# Patient Record
Sex: FEMALE | Race: WHITE | Employment: FULL TIME | URBAN - NONMETROPOLITAN AREA
[De-identification: names, ages, dates, MRNs, and addresses within clinical notes are randomized per-mention and may not be internally consistent; named-entity substitution may affect disease eponyms.]

---

## 2018-02-13 ENCOUNTER — HOSPITAL ENCOUNTER (OUTPATIENT)
Age: 23
Setting detail: SPECIMEN
Discharge: HOME | End: 2018-02-13
Payer: COMMERCIAL

## 2018-02-13 LAB
HBV SURFACE AB TITR SER: 442.9 MIU/ML
RUBV IGG SER QL: 179.7 IU/ML

## 2018-02-13 PROCEDURE — 86765 RUBEOLA ANTIBODY: CPT

## 2018-02-13 PROCEDURE — 86317 IMMUNOASSAY INFECTIOUS AGENT: CPT

## 2018-02-13 PROCEDURE — 86787 VARICELLA-ZOSTER ANTIBODY: CPT

## 2018-02-13 PROCEDURE — 86762 RUBELLA ANTIBODY: CPT

## 2018-02-13 PROCEDURE — 86735 MUMPS ANTIBODY: CPT

## 2018-02-16 LAB
MEASLES IMMUNE (IGG): 1.65
MUV IGG SER QL: 1.88
VZV IGG SER QL IA: 4.2

## 2019-03-07 ENCOUNTER — HOSPITAL ENCOUNTER (OUTPATIENT)
Age: 24
Discharge: HOME | End: 2019-03-07
Payer: COMMERCIAL

## 2022-02-17 ENCOUNTER — TELEPHONE (OUTPATIENT)
Dept: OBGYN | Age: 27
End: 2022-02-17

## 2022-02-17 ENCOUNTER — HOSPITAL ENCOUNTER (OUTPATIENT)
Age: 27
Discharge: HOME | End: 2022-02-17
Payer: COMMERCIAL

## 2022-02-17 DIAGNOSIS — O20.0 THREATENED ABORTION: ICD-10-CM

## 2022-02-17 DIAGNOSIS — O20.0 THREATENED ABORTION: Primary | ICD-10-CM

## 2022-02-17 LAB — HCG QUANTITATIVE: 809 IU/L

## 2022-02-17 PROCEDURE — 84702 CHORIONIC GONADOTROPIN TEST: CPT

## 2022-02-17 PROCEDURE — 36415 COLL VENOUS BLD VENIPUNCTURE: CPT

## 2022-02-19 ENCOUNTER — HOSPITAL ENCOUNTER (OUTPATIENT)
Age: 27
Discharge: HOME | End: 2022-02-19
Payer: COMMERCIAL

## 2022-02-19 ENCOUNTER — NURSE TRIAGE (OUTPATIENT)
Dept: OTHER | Age: 27
End: 2022-02-19

## 2022-02-19 ENCOUNTER — TELEPHONE (OUTPATIENT)
Dept: OTHER | Age: 27
End: 2022-02-19

## 2022-02-19 DIAGNOSIS — O20.0 THREATENED ABORTION: ICD-10-CM

## 2022-02-19 LAB — HCG QUANTITATIVE: 276 IU/L

## 2022-02-19 PROCEDURE — 84702 CHORIONIC GONADOTROPIN TEST: CPT

## 2022-02-19 PROCEDURE — 36415 COLL VENOUS BLD VENIPUNCTURE: CPT

## 2022-02-19 NOTE — TELEPHONE ENCOUNTER
Patient of Dr. Lakeisha Tobias. Patient returning call stating she would really like to speak to on call provider. Patient was advised per guidelines to call office Monday to discuss lab results. Patient is currently having a miscarriage. She did complete labs today. She is still having vaginal bleeding and would like further care instruction from the on call provider. Flor Bowers called and connected to patient.       Reason for Disposition   Health Information question, no triage required and triager able to answer question    Protocols used: INFORMATION ONLY CALL - NO TRIAGE-ADULT-

## 2022-02-19 NOTE — TELEPHONE ENCOUNTER
Pt calling re: lab results on My Chart. Advised per [facility] guidelines to call office on Monday.   Brent Palma

## 2022-02-21 DIAGNOSIS — O20.0 THREATENED ABORTION: Primary | ICD-10-CM

## 2022-03-04 ENCOUNTER — HOSPITAL ENCOUNTER (OUTPATIENT)
Age: 27
Discharge: HOME | End: 2022-03-04
Payer: COMMERCIAL

## 2022-03-04 DIAGNOSIS — O20.0 THREATENED ABORTION: ICD-10-CM

## 2022-03-04 LAB — HCG QUANTITATIVE: 4 IU/L

## 2022-03-04 PROCEDURE — 36415 COLL VENOUS BLD VENIPUNCTURE: CPT

## 2022-03-04 PROCEDURE — 84702 CHORIONIC GONADOTROPIN TEST: CPT

## 2022-06-22 LAB
CHOLESTEROL/HDL RATIO: 2.7
CHOLESTEROL: 165 MG/DL
GLUCOSE BLD-MCNC: 87 MG/DL (ref 70–99)
HDLC SERPL-MCNC: 62 MG/DL
LDL CHOLESTEROL: 93 MG/DL (ref 0–130)
PATIENT FASTING?: YES
TRIGL SERPL-MCNC: 51 MG/DL

## 2022-06-26 ENCOUNTER — HOSPITAL ENCOUNTER (OUTPATIENT)
Age: 27
Setting detail: SPECIMEN
Discharge: HOME | End: 2022-06-26
Payer: COMMERCIAL

## 2022-06-26 LAB — HCG QUANTITATIVE: 1 MIU/ML

## 2022-06-26 PROCEDURE — 84702 CHORIONIC GONADOTROPIN TEST: CPT

## 2022-06-26 PROCEDURE — 36415 COLL VENOUS BLD VENIPUNCTURE: CPT

## 2022-07-11 ENCOUNTER — HOSPITAL ENCOUNTER (OUTPATIENT)
Age: 27
Discharge: HOME | End: 2022-07-11
Payer: COMMERCIAL

## 2022-07-11 DIAGNOSIS — Z32.01 POSITIVE URINE PREGNANCY TEST: Primary | ICD-10-CM

## 2022-07-11 DIAGNOSIS — Z32.01 POSITIVE URINE PREGNANCY TEST: ICD-10-CM

## 2022-07-11 LAB — HCG QUANTITATIVE: 222 MIU/ML

## 2022-07-11 PROCEDURE — 36415 COLL VENOUS BLD VENIPUNCTURE: CPT

## 2022-07-11 PROCEDURE — 84702 CHORIONIC GONADOTROPIN TEST: CPT

## 2022-07-13 ENCOUNTER — HOSPITAL ENCOUNTER (OUTPATIENT)
Age: 27
Discharge: HOME | End: 2022-07-13
Payer: COMMERCIAL

## 2022-07-13 DIAGNOSIS — Z32.01 POSITIVE URINE PREGNANCY TEST: ICD-10-CM

## 2022-07-13 LAB — HCG QUANTITATIVE: 99 MIU/ML

## 2022-07-13 PROCEDURE — 84702 CHORIONIC GONADOTROPIN TEST: CPT

## 2022-07-13 PROCEDURE — 36415 COLL VENOUS BLD VENIPUNCTURE: CPT

## 2022-07-15 ENCOUNTER — HOSPITAL ENCOUNTER (EMERGENCY)
Age: 27
Discharge: HOME | End: 2022-07-15
Attending: STUDENT IN AN ORGANIZED HEALTH CARE EDUCATION/TRAINING PROGRAM
Payer: COMMERCIAL

## 2022-07-15 ENCOUNTER — APPOINTMENT (OUTPATIENT)
Dept: ULTRASOUND IMAGING | Age: 27
End: 2022-07-15
Payer: COMMERCIAL

## 2022-07-15 ENCOUNTER — NURSE TRIAGE (OUTPATIENT)
Dept: OTHER | Age: 27
End: 2022-07-15

## 2022-07-15 ENCOUNTER — HOSPITAL ENCOUNTER (OUTPATIENT)
Age: 27
Discharge: HOME | End: 2022-07-15
Payer: COMMERCIAL

## 2022-07-15 VITALS
RESPIRATION RATE: 16 BRPM | DIASTOLIC BLOOD PRESSURE: 56 MMHG | TEMPERATURE: 98.4 F | OXYGEN SATURATION: 98 % | HEART RATE: 86 BPM | SYSTOLIC BLOOD PRESSURE: 112 MMHG

## 2022-07-15 DIAGNOSIS — O03.9 MISCARRIAGE: Primary | ICD-10-CM

## 2022-07-15 LAB
-: NORMAL
ABO/RH: NORMAL
BILIRUBIN URINE: NEGATIVE
COLOR: YELLOW
EPITHELIAL CELLS UA: NORMAL /HPF (ref 0–25)
GLUCOSE URINE: NEGATIVE
HCG QUANTITATIVE: 106 MIU/ML
KETONES, URINE: NEGATIVE
LEUKOCYTE ESTERASE, URINE: NEGATIVE
NITRITE, URINE: NEGATIVE
PH UA: 6 (ref 5–9)
PROTEIN UA: NEGATIVE
RBC UA: NORMAL /HPF (ref 0–2)
SPECIFIC GRAVITY UA: 1.02 (ref 1.01–1.02)
TURBIDITY: CLEAR
URINE HGB: ABNORMAL
UROBILINOGEN, URINE: NORMAL
WBC UA: NORMAL /HPF (ref 0–5)

## 2022-07-15 PROCEDURE — 99284 EMERGENCY DEPT VISIT MOD MDM: CPT

## 2022-07-15 PROCEDURE — 86900 BLOOD TYPING SEROLOGIC ABO: CPT

## 2022-07-15 PROCEDURE — 81001 URINALYSIS AUTO W/SCOPE: CPT

## 2022-07-15 PROCEDURE — 84702 CHORIONIC GONADOTROPIN TEST: CPT

## 2022-07-15 PROCEDURE — 36415 COLL VENOUS BLD VENIPUNCTURE: CPT

## 2022-07-15 PROCEDURE — 86901 BLOOD TYPING SEROLOGIC RH(D): CPT

## 2022-07-15 PROCEDURE — 76817 TRANSVAGINAL US OBSTETRIC: CPT

## 2022-07-15 ASSESSMENT — ENCOUNTER SYMPTOMS
SHORTNESS OF BREATH: 0
NAUSEA: 0
ABDOMINAL PAIN: 0
VOMITING: 0
COUGH: 0

## 2022-07-15 NOTE — TELEPHONE ENCOUNTER
Pt calling regarding lab results and HCG levels. Pt also complaining abdominal cramping that started today along with passing a decent size clot per the pt. Pt states she has been bleeding for three weeks. Pt is currently only filling one pad a day. Pt agreeable to care advice given per office guidelines. Reason for Disposition   Health Information question, no triage required and triager able to answer question    Protocols used:  Information Only Call - No Triage-[facility]

## 2022-07-16 NOTE — DISCHARGE INSTRUCTIONS
Follow-up with your OB/GYN this week. If you develop severe pain, fevers, sweats, or any other concerns please return to the emergency department.

## 2022-07-16 NOTE — ED PROVIDER NOTES
[facility] ED  EMERGENCY DEPARTMENT ENCOUNTER      Pt Name: Jaun Martinez  MRN: [id number]  [place] 1995  Date of evaluation: 7/15/2022  Provider: Carlos [facility]       Chief Complaint   Patient presents with    Vaginal Bleeding     Vaginal bleeding x 3 week. HISTORY OF PRESENT ILLNESS   (Location/Symptom, Timing/Onset, Context/Setting, Quality, Duration, Modifying Factors, Severity)  Note limiting factors. Jaun Martinez is a 31 y.o. female who presents to the emergency department with persistent vaginal bleeding and cramping. Patient states that she is a G2, P0 female who had a positive pregnancy test 3 weeks ago and has been having persistent vaginal bleeding since then as well as some mild pelvic cramping that is controlled with Tylenol. She has been following with her OBG however her beta-hCG has not fallen to 0 yet. Last reading was 111. She called her OB office and was told to come in to be evaluated in the emergency department. She denies any fevers, chills, sweats, chest pain or difficulty breathing, abdominal pain, nausea, vomiting, diarrhea, leg swelling, abnormal vaginal discharge or concern for STD exposure. Denies dysuria or hematuria. HPI    Nursing Notes were reviewed. REVIEW OF SYSTEMS    (2-9 systems for level 4, 10 or more for level 5)     Review of Systems   Constitutional:  Negative for chills and fever. HENT: Negative. Respiratory:  Negative for cough and shortness of breath. Cardiovascular:  Negative for chest pain and palpitations. Gastrointestinal:  Negative for abdominal pain, nausea and vomiting. Genitourinary:  Positive for pelvic pain and vaginal bleeding. Negative for dysuria and vaginal discharge. Skin:  Negative for rash and wound. Neurological:  Negative for weakness, numbness and headaches. Except as noted above the remainder of the review of systems was reviewed and negative.        PAST MEDICAL HISTORY History reviewed. No pertinent past medical history. SURGICAL HISTORY     History reviewed. No pertinent surgical history. CURRENT MEDICATIONS       There are no discharge medications for this patient. ALLERGIES     Ceclor [cefaclor] and Pcn [penicillins]    FAMILY HISTORY     History reviewed. No pertinent family history. SOCIAL HISTORY       Social History     Socioeconomic History    Marital status: Single     Spouse name: None    Number of children: None    Years of education: None    Highest education level: None   Tobacco Use    Smoking status: Never    Smokeless tobacco: Never       SCREENINGS        Stefani Coma Scale  Eye Opening: Spontaneous  Best Verbal Response: Oriented  Best Motor Response: Obeys commands  Stefani Coma Scale Score: 15               PHYSICAL EXAM    (up to 7 for level 4, 8 or more for level 5)     ED Triage Vitals   BP Temp Temp Source Heart Rate Resp SpO2 Height Weight   07/15/22 2010 07/15/22 2024 07/15/22 2024 07/15/22 2009 07/15/22 2009 07/15/22 2009 -- --   (!) 112/56 98.4 °F (36.9 °C) Tympanic 86 16 98 %         Physical Exam  Vitals and nursing note reviewed. Constitutional:       General: She is not in acute distress. Appearance: Normal appearance. She is not ill-appearing, toxic-appearing or diaphoretic. Cardiovascular:      Rate and Rhythm: Normal rate and regular rhythm. Heart sounds: No murmur heard. No gallop. Pulmonary:      Effort: Pulmonary effort is normal.      Breath sounds: Normal breath sounds. Abdominal:      General: There is no distension. Tenderness: There is no abdominal tenderness. Skin:     General: Skin is warm and dry. Neurological:      Mental Status: She is alert.        DIAGNOSTIC RESULTS     EKG: All EKG's are interpreted by the Emergency Department Physician who either signs or Co-signs this chart in the absence of a cardiologist.        RADIOLOGY:   Non-plain film images such as CT, Ultrasound and MRI are read by the radiologist. Plain radiographic images are visualized and preliminarily interpreted by the emergency physician with the below findings:        Interpretation per the Radiologist below, if available at the time of this note:    US OB TRANSVAGINAL   Final Result   There are two 2 mm cystic areas in the endometrium at the lower uterine   segment. Some mild heterogenous material at the site but without   hypervascularity. The remaining mid and upper portion of the endometrium is   not thickened or hypervascular. 1.1 cm heterogenous area in the right ovary is likely a degenerating corpus   luteum. No significant free fluid in the pelvis. ED BEDSIDE ULTRASOUND:   Performed by ED Physician - none    LABS:  Labs Reviewed   URINALYSIS WITH REFLEX TO CULTURE - Abnormal; Notable for the following components:       Result Value    Specific Gravity, UA 1.025 (*)     Urine Hgb 2+ (*)     All other components within normal limits   MICROSCOPIC URINALYSIS   ABO/RH       All other labs were within normal range or not returned as of this dictation. EMERGENCY DEPARTMENT COURSE and DIFFERENTIAL DIAGNOSIS/MDM:   Vitals:    Vitals:    07/15/22 2009 07/15/22 2010 07/15/22 2024   BP:  (!) 112/56    Pulse: 86     Resp: 16     Temp:   98.4 °F (36.9 °C)   TempSrc:   Tympanic   SpO2: 98%         Patient is a 25-year-old female presenting due to miscarriage and beta hCG not coming back down to 0. On exam vitals are normal patient is in no acute distress. Abdomen is soft and nontender. Patient states that she has been having mild vaginal bleeding and spotting for the past 3 weeks and when she called her OB/GYN's office, she was told to be evaluated in the emergency department. Blood type was obtained and patient was found to be a positive and therefore no need for RhoGAM.  Urinalysis unremarkable.   Transvaginal ultrasound reveals some heterogeneous material at the lower segment of the uterus, likely material from miscarriage. As patient's beta-hCG has been slowly coming down and she is still bleeding, I instructed her that she will likely pass the rest of this material at home and instructed her to follow-up with her OB/GYN. If she develops any severe pain, fevers, chills or sweats patient instructed to return to the emergency department. MDM        REASSESSMENT          CRITICAL CARE TIME       CONSULTS:  None    PROCEDURES:  Unless otherwise noted below, none     Procedures      FINAL IMPRESSION      1. Miscarriage          DISPOSITION/PLAN   DISPOSITION Decision To Discharge 07/15/2022 11:43:56 PM      PATIENT REFERRED TO:  OB/GYN          DISCHARGE MEDICATIONS:  There are no discharge medications for this patient. Controlled Substances Monitoring:     No flowsheet data found.     (Please note that portions of this note were completed with a voice recognition program.  Efforts were made to edit the dictations but occasionally words are mis-transcribed.)    Mila Woodson DO (electronically signed)  Attending Emergency Physician           Mason Simpson DO  07/15/22 5720

## 2022-07-18 DIAGNOSIS — O03.9 SAB (SPONTANEOUS ABORTION): Primary | ICD-10-CM

## 2022-07-19 ENCOUNTER — HOSPITAL ENCOUNTER (OUTPATIENT)
Age: 27
Discharge: HOME | End: 2022-07-19
Payer: COMMERCIAL

## 2022-07-19 DIAGNOSIS — O03.9 SAB (SPONTANEOUS ABORTION): ICD-10-CM

## 2022-07-19 LAB — HCG QUANTITATIVE: 36 MIU/ML

## 2022-07-19 PROCEDURE — 36415 COLL VENOUS BLD VENIPUNCTURE: CPT

## 2022-07-19 PROCEDURE — 84702 CHORIONIC GONADOTROPIN TEST: CPT

## 2022-07-20 ENCOUNTER — OFFICE VISIT (OUTPATIENT)
Dept: OBGYN | Age: 27
End: 2022-07-20
Payer: COMMERCIAL

## 2022-07-20 VITALS
DIASTOLIC BLOOD PRESSURE: 78 MMHG | WEIGHT: 178 LBS | HEIGHT: 64 IN | SYSTOLIC BLOOD PRESSURE: 118 MMHG | BODY MASS INDEX: 30.39 KG/M2

## 2022-07-20 DIAGNOSIS — O03.9 SAB (SPONTANEOUS ABORTION): Primary | ICD-10-CM

## 2022-07-20 PROCEDURE — 99213 OFFICE O/P EST LOW 20 MIN: CPT

## 2022-07-21 ENCOUNTER — TELEPHONE (OUTPATIENT)
Dept: OBGYN CLINIC | Age: 27
End: 2022-07-21

## 2022-07-21 DIAGNOSIS — O03.9 SAB (SPONTANEOUS ABORTION): Primary | ICD-10-CM

## 2022-07-21 NOTE — TELEPHONE ENCOUNTER
Please call patient and let her know that I spoke with Dr. Jose M Cole about recommendations for labs. She suggested that we do a day 21 progesterone lab. Once I have her hCG lab from next week, I will add this as a standing order and she can have it drawn on day 21 of next cycle. If low, we can start supplementing with next positive pregnancy test to help prevent another miscarriage. Please also ask her if she has any family history of blood clots - if so, we can offer coagulation panel to make sure there are no other contributing factors. Thanks!

## 2022-07-26 ENCOUNTER — HOSPITAL ENCOUNTER (OUTPATIENT)
Age: 27
Discharge: HOME | End: 2022-07-26
Payer: COMMERCIAL

## 2022-07-26 DIAGNOSIS — O03.9 SAB (SPONTANEOUS ABORTION): Primary | ICD-10-CM

## 2022-07-26 LAB — HCG QUANTITATIVE: 33 MIU/ML

## 2022-07-26 PROCEDURE — 84702 CHORIONIC GONADOTROPIN TEST: CPT

## 2022-07-26 PROCEDURE — 36415 COLL VENOUS BLD VENIPUNCTURE: CPT

## 2022-07-26 NOTE — PROGRESS NOTES
DATE OF VISIT:  7/26/22    PATIENT NAME:  Rakesh Cartagena     YOB: 1995    REASON FOR VISIT:    Chief Complaint   Patient presents with    Miscarriage     Patient is being seen for SAB, she notes that she stopped bleeding yesterday. She had been bleeding since 6/25/22. Angelica Oar yesterday did drop significantly. She has slight back pain. She feels like she has been dealing ok, has a good support system. HISTORY OF PRESENT ILLNESS:  Patient presents for SAB. She was initially scheduled to discuss irregular bleeding but was determined to be having SAB. Irregular bleeding has been ongoing since 06/25/2022 but did stop yesterday. hCG quant currently at 39 - will repeat next week and discuss plan from there. Patient curious about options to help prevent future miscarriages as she had one previously in February. Discussed with Dr. Alberto Never - would recommend checking day 21 progesterone to make sure supplementation not needed. Aware that she will receive call with next steps pending next week's hCG level. Patient's last menstrual period was 06/25/2022. Vitals:    07/20/22 0818   BP: 118/78   Weight: 178 lb (80.7 kg)   Height: 5' 4\" (1.626 m)     Body mass index is 30.55 kg/m². Allergies   Allergen Reactions    Ceclor [Cefaclor]     Pcn [Penicillins] Rash     Current Outpatient Medications   Medication Sig Dispense Refill    Prenatal MV-Min-Fe Fum-FA-DHA (PRENATAL 1 PO) Take by mouth       No current facility-administered medications for this visit.      Social History     Socioeconomic History    Marital status: Single     Spouse name: None    Number of children: None    Years of education: None    Highest education level: None   Tobacco Use    Smoking status: Never    Smokeless tobacco: Never   Substance and Sexual Activity    Alcohol use: Not Currently     Comment: Occasional    Drug use: Never    Sexual activity: Yes     Partners: Male       REVIEW OF SYSTEMS:  Review of Systems Constitutional:  Negative for chills, fatigue and fever. Genitourinary:  Positive for menstrual problem (irregular bleeding, SAB). Negative for dysuria, frequency, pelvic pain and vaginal discharge. PHYSICAL EXAM:  /78   Ht 5' 4\" (1.626 m)   Wt 178 lb (80.7 kg)   LMP 2022   Breastfeeding Unknown   BMI 30.55 kg/m²   Physical Exam  Constitutional:       Appearance: Normal appearance. HENT:      Head: Normocephalic and atraumatic. Mouth/Throat:      Mouth: Mucous membranes are moist.   Eyes:      Extraocular Movements: Extraocular movements intact. Musculoskeletal:         General: Normal range of motion. Cervical back: Normal range of motion. Neurological:      General: No focal deficit present. Mental Status: She is alert and oriented to person, place, and time. Skin:     General: Skin is warm and dry. Psychiatric:         Mood and Affect: Mood normal.         Behavior: Behavior normal.         Thought Content: Thought content normal.     The patient, Evaline Epley is a 31 y.o. female, was seen with a total time spent of 20 minutes for the visit on this date of service by the E/M provider. The time component had both face to face and non face to face time spent in determining the total time component. Counseling and education regarding her diagnosis listed below and her options regarding those diagnoses were also included in determining her time component. The patient had her preventative health maintenance recommendations and follow-up reviewed with her at the completion of her visit. ASSESSMENT:  1. SAB (spontaneous )        PLAN:  No orders of the defined types were placed in this encounter. Return in about 1 week (around 2022) for repeat hCG quant.        Electronically signed by Keily Ferrari PA-C on 22

## 2022-08-02 NOTE — TELEPHONE ENCOUNTER
Tried to call patient and remind her to have repeat HCG this week. There was no answer and VM was full. Will try again tomorrow.

## 2022-08-03 ENCOUNTER — HOSPITAL ENCOUNTER (OUTPATIENT)
Age: 27
Discharge: HOME | End: 2022-08-03
Payer: COMMERCIAL

## 2022-08-03 DIAGNOSIS — O03.9 SAB (SPONTANEOUS ABORTION): ICD-10-CM

## 2022-08-03 LAB — HCG QUANTITATIVE: <1 MIU/ML

## 2022-08-03 PROCEDURE — 36415 COLL VENOUS BLD VENIPUNCTURE: CPT

## 2022-08-03 PROCEDURE — 84702 CHORIONIC GONADOTROPIN TEST: CPT

## 2022-08-08 NOTE — TELEPHONE ENCOUNTER
Poke with patient, today is day 1 of cycle. Advised her to have progesterone level drawn on 8/29/22. Order entered.

## 2022-08-29 ENCOUNTER — HOSPITAL ENCOUNTER (OUTPATIENT)
Age: 27
Discharge: HOME | End: 2022-08-29
Payer: COMMERCIAL

## 2022-08-29 DIAGNOSIS — O03.9 SAB (SPONTANEOUS ABORTION): ICD-10-CM

## 2022-08-29 LAB
HCG QUANTITATIVE: <1 MIU/ML
PROGESTERONE LEVEL: 5.77 NG/ML

## 2022-08-29 PROCEDURE — 84144 ASSAY OF PROGESTERONE: CPT

## 2022-08-29 PROCEDURE — 36415 COLL VENOUS BLD VENIPUNCTURE: CPT

## 2022-08-29 PROCEDURE — 84702 CHORIONIC GONADOTROPIN TEST: CPT

## 2022-12-08 ENCOUNTER — TELEPHONE (OUTPATIENT)
Dept: OBGYN | Age: 27
End: 2022-12-08

## 2022-12-08 ENCOUNTER — HOSPITAL ENCOUNTER (OUTPATIENT)
Age: 27
Discharge: HOME | End: 2022-12-08
Payer: COMMERCIAL

## 2022-12-08 DIAGNOSIS — N91.2 AMENORRHEA: Primary | ICD-10-CM

## 2022-12-08 DIAGNOSIS — N91.2 AMENORRHEA: ICD-10-CM

## 2022-12-08 LAB — HCG QUANTITATIVE: <1 MIU/ML

## 2022-12-08 PROCEDURE — 84702 CHORIONIC GONADOTROPIN TEST: CPT

## 2022-12-08 PROCEDURE — 36415 COLL VENOUS BLD VENIPUNCTURE: CPT

## 2022-12-08 NOTE — TELEPHONE ENCOUNTER
Patient LVM on  phone. I return called as Eduar Hensley gave me the message. Patient states she is 5days late on her cycle but keeps getting neg. At home tests. Would like lab work to confirm. HCG lab order was placed.

## 2022-12-12 DIAGNOSIS — N92.6 LATE MENSES: Primary | ICD-10-CM

## 2023-02-16 ENCOUNTER — HOSPITAL ENCOUNTER (OUTPATIENT)
Age: 28
Discharge: HOME | End: 2023-02-16
Payer: COMMERCIAL

## 2023-02-16 ENCOUNTER — TELEPHONE (OUTPATIENT)
Dept: OBGYN | Age: 28
End: 2023-02-16

## 2023-02-16 DIAGNOSIS — Z32.01 POSITIVE PREGNANCY TEST: ICD-10-CM

## 2023-02-16 DIAGNOSIS — Z32.01 POSITIVE PREGNANCY TEST: Primary | ICD-10-CM

## 2023-02-16 LAB — HCG QUANTITATIVE: 92 MIU/ML

## 2023-02-16 PROCEDURE — 84702 CHORIONIC GONADOTROPIN TEST: CPT

## 2023-02-16 PROCEDURE — 36415 COLL VENOUS BLD VENIPUNCTURE: CPT

## 2023-02-16 NOTE — TELEPHONE ENCOUNTER
Patient called and stated she has a positive pregnancy test but is spotting. Requesting an HCG draw. Order placed.

## 2023-02-18 ENCOUNTER — HOSPITAL ENCOUNTER (OUTPATIENT)
Age: 28
Discharge: HOME | End: 2023-02-18
Payer: COMMERCIAL

## 2023-02-18 DIAGNOSIS — Z32.01 POSITIVE PREGNANCY TEST: ICD-10-CM

## 2023-02-18 LAB — HCG QUANTITATIVE: 73 MIU/ML

## 2023-02-18 PROCEDURE — 36415 COLL VENOUS BLD VENIPUNCTURE: CPT

## 2023-02-18 PROCEDURE — 84702 CHORIONIC GONADOTROPIN TEST: CPT

## 2023-02-20 ENCOUNTER — HOSPITAL ENCOUNTER (OUTPATIENT)
Age: 28
Discharge: HOME | End: 2023-02-20
Payer: COMMERCIAL

## 2023-02-20 DIAGNOSIS — O03.9 SAB (SPONTANEOUS ABORTION): Primary | ICD-10-CM

## 2023-02-20 DIAGNOSIS — O03.9 SAB (SPONTANEOUS ABORTION): ICD-10-CM

## 2023-02-20 LAB — HCG QUANTITATIVE: 32 MIU/ML

## 2023-02-20 PROCEDURE — 36415 COLL VENOUS BLD VENIPUNCTURE: CPT

## 2023-02-20 PROCEDURE — 84702 CHORIONIC GONADOTROPIN TEST: CPT

## 2023-02-28 ENCOUNTER — OFFICE VISIT (OUTPATIENT)
Dept: OBGYN | Age: 28
End: 2023-02-28

## 2023-02-28 ENCOUNTER — HOSPITAL ENCOUNTER (OUTPATIENT)
Age: 28
Discharge: HOME | End: 2023-02-28
Payer: COMMERCIAL

## 2023-02-28 VITALS
BODY MASS INDEX: 32.95 KG/M2 | WEIGHT: 193 LBS | DIASTOLIC BLOOD PRESSURE: 70 MMHG | HEIGHT: 64 IN | SYSTOLIC BLOOD PRESSURE: 112 MMHG

## 2023-02-28 DIAGNOSIS — N96 HISTORY OF RECURRENT MISCARRIAGES, NOT CURRENTLY PREGNANT: ICD-10-CM

## 2023-02-28 DIAGNOSIS — Q51.3 BICORNATE UTERUS: ICD-10-CM

## 2023-02-28 DIAGNOSIS — N96 HISTORY OF RECURRENT MISCARRIAGES, NOT CURRENTLY PREGNANT: Primary | ICD-10-CM

## 2023-02-28 LAB
HCG QUANTITATIVE: <1 MIU/ML
T3 SERPL-MCNC: 123 NG/DL (ref 60–181)
TSH SERPL-ACNC: 0.76 UIU/ML (ref 0.3–5)

## 2023-02-28 PROCEDURE — 85610 PROTHROMBIN TIME: CPT

## 2023-02-28 PROCEDURE — 36415 COLL VENOUS BLD VENIPUNCTURE: CPT

## 2023-02-28 PROCEDURE — 85730 THROMBOPLASTIN TIME PARTIAL: CPT

## 2023-02-28 PROCEDURE — 84436 ASSAY OF TOTAL THYROXINE: CPT

## 2023-02-28 PROCEDURE — 85613 RUSSELL VIPER VENOM DILUTED: CPT

## 2023-02-28 PROCEDURE — 81241 F5 GENE: CPT

## 2023-02-28 PROCEDURE — 84439 ASSAY OF FREE THYROXINE: CPT

## 2023-02-28 PROCEDURE — 84480 ASSAY TRIIODOTHYRONINE (T3): CPT

## 2023-02-28 PROCEDURE — 83036 HEMOGLOBIN GLYCOSYLATED A1C: CPT

## 2023-02-28 PROCEDURE — 81240 F2 GENE: CPT

## 2023-02-28 PROCEDURE — 84702 CHORIONIC GONADOTROPIN TEST: CPT

## 2023-02-28 PROCEDURE — 81291 MTHFR GENE: CPT

## 2023-02-28 PROCEDURE — 84443 ASSAY THYROID STIM HORMONE: CPT

## 2023-02-28 PROCEDURE — 86147 CARDIOLIPIN ANTIBODY EA IG: CPT

## 2023-02-28 ASSESSMENT — PATIENT HEALTH QUESTIONNAIRE - PHQ9
SUM OF ALL RESPONSES TO PHQ QUESTIONS 1-9: 0
1. LITTLE INTEREST OR PLEASURE IN DOING THINGS: 0
SUM OF ALL RESPONSES TO PHQ QUESTIONS 1-9: 0
SUM OF ALL RESPONSES TO PHQ QUESTIONS 1-9: 0
SUM OF ALL RESPONSES TO PHQ9 QUESTIONS 1 & 2: 0
SUM OF ALL RESPONSES TO PHQ QUESTIONS 1-9: 0
2. FEELING DOWN, DEPRESSED OR HOPELESS: 0

## 2023-02-28 NOTE — PROGRESS NOTES
PROBLEM VISIT     Date of service: 2023    Cecy Rios  Is a 28 y.o. , female    PT's PCP is: None None     : 1995                                             Subjective:       Patient's last menstrual period was 2023. OB History    Para Term  AB Living   2 0     2 0   SAB IAB Ectopic Molar Multiple Live Births   2                # Outcome Date GA Lbr Faisal/2nd Weight Sex Delivery Anes PTL Lv   2 SAB 2022 4w0d    SAB      1 SAB 2022 6w0d    SAB   ND        Social History     Tobacco Use   Smoking Status Never   Smokeless Tobacco Never        Social History     Substance and Sexual Activity   Alcohol Use Not Currently    Comment: Occasional       Allergies: Ceclor [cefaclor] and Pcn [penicillins]      Current Outpatient Medications:     Prenatal MV-Min-Fe Fum-FA-DHA (PRENATAL 1 PO), Take by mouth, Disp: , Rfl:     progesterone (ENDOMETRIN) 100 MG suppository, Place 1 suppository vaginally daily (Patient not taking: Reported on 2023), Disp: 30 suppository, Rfl: 3    Social History     Substance and Sexual Activity   Sexual Activity Yes    Partners: Male       Last Yearly date:  about 3 years ago. Last pap date and results: about 3 years ago. Last HPV date and results: about 3 years ago    Have you had a positive covid test: No    Have you had the covid immunization: No    Chief Complaint   Patient presents with    Infertility     Patient here to discuss infertility. Patient has had 3 SAB in the last year. Patient was on progesterone for two weeks until she started her cycle for 2 months. Than Dr. Syeda De La Rosa had her do the progesterone just when she gets a positive pregnancy test. Wants to discuss options they have. PE:  Vital Signs  Blood pressure 112/70, height 5' 4\" (1.626 m), weight 193 lb (87.5 kg), last menstrual period 2023, not currently breastfeeding.   Estimated body mass index is 33.13 kg/m² as calculated from the following:    Height as of this encounter: 5' 4\" (1.626 m). Weight as of this encounter: 193 lb (87.5 kg). PHQ-9 Total Score: 0 (2/28/2023  1:46 PM)    NURSE: CHARBEL Coffman CNM      HPI: Patient here today for recurrent miscarriages. Patient states they are all about the same. Her first 1 was in feb 22 pt was about 5 weeks. start bleeding lasted 2-3 weeks  July 22 very early - bleed for 30-40 day slight late period u/s with this one  Feb 23- positive at home test feb 14  Quants 16,18,20  Lmp should have been the 11th - did not bleed till the 20  Now she is spotting. No family history of clotting issues or miscarriage issues. Yes PT denies fever, chills, nausea and vomiting       Objective                     Results reviewed today:    2/28/2023  2:31 PM EST   UTERUS: Homogenous appearing bicornuate uterus, small 8 mm fibroid noted      ENDO: measures 2.6 mm     RT. OVARY: sub cm cysts noted     LT. OVARY: sub-cm cysts noted     No free fluid      Latest Reference Range & Units 2/17/22 17:12 2/19/22 16:53 3/4/22 03:55 6/26/22 06:47 7/11/22 14:31 7/13/22 12:28 7/15/22 16:43 7/19/22 16:52 7/26/22 04:45 8/3/22 05:07 8/29/22 12:32 12/8/22 15:37 2/16/23 16:25 2/18/23 11:45 2/20/23 17:06   hCG Quant <5 mIU/mL 809 (H) 276 (H) 4 1 222 (H) 99 (H) 106 (H) 36 (H) 33 (H) <1 <1 <1 92 (H) 73 (H) 32 (H)   (H): Data is abnormally high                            Assessment and Plan          Diagnosis Orders   1. 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 Bicornate uterus              Sperm Analysis orders also given to patient for significant other      I am having Urban Gamboa. Porfirio maintain her Prenatal MV-Min-Fe Fum-FA-DHA (PRENATAL 1 PO) and progesterone. Return in about 2 weeks (around 3/14/2023), or lab reveiw.     She was also counseled on her preventative health maintenance recommendations and follow-up. There are no Patient Instructions on file for this visit.     Paulette Officer, CHARBEL Amezcua CNM,2/28/2023 2:49 PM

## 2023-03-01 LAB
CARDIOLIPIN IGA SER IA-ACNC: 2.1 APL (ref 0–14)
CARDIOLIPIN IGG SER IA-ACNC: 2.4 GPL (ref 0–10)
CARDIOLIPIN IGM SER IA-ACNC: 5.5 MPL (ref 0–10)
DILUTE RUSSELL VIPER VENOM TIME: NORMAL
EST. AVERAGE GLUCOSE BLD GHB EST-MCNC: 97 MG/DL
HBA1C MFR BLD: 5 % (ref 4–6)
INR PPP: 0.9
PARTIAL THROMBOPLASTIN TIME: 31.7 SEC (ref 26.8–34.8)
PROTHROMBIN TIME: 12.3 SEC (ref 11.5–14.2)
T4 FREE SERPL-MCNC: 1.14 NG/DL (ref 0.93–1.7)
T4 SERPL-MCNC: 9 UG/DL (ref 4.5–10.9)

## 2023-03-03 LAB
MTHFR INTERPRETATION: NORMAL
MTHFR MUTATION A1286C: NEGATIVE
MTHFR MUTATION C665T: NEGATIVE
SPECIMEN: NORMAL

## 2023-03-05 LAB
FACTOR V MUTATION: ABNORMAL
PROTHROMBIN G20210A MUTATION: NEGATIVE
SPECIMEN SOURCE: NORMAL
SPECIMEN: ABNORMAL

## 2023-03-08 ENCOUNTER — TELEPHONE (OUTPATIENT)
Dept: OBGYN | Age: 28
End: 2023-03-08

## 2023-03-14 ENCOUNTER — OFFICE VISIT (OUTPATIENT)
Dept: OBGYN | Age: 28
End: 2023-03-14
Payer: COMMERCIAL

## 2023-03-14 VITALS
BODY MASS INDEX: 33.29 KG/M2 | SYSTOLIC BLOOD PRESSURE: 118 MMHG | WEIGHT: 195 LBS | DIASTOLIC BLOOD PRESSURE: 70 MMHG | HEIGHT: 64 IN

## 2023-03-14 DIAGNOSIS — D68.51 FACTOR V LEIDEN MUTATION (HCC): ICD-10-CM

## 2023-03-14 DIAGNOSIS — N96 HISTORY OF RECURRENT MISCARRIAGES, NOT CURRENTLY PREGNANT: ICD-10-CM

## 2023-03-14 DIAGNOSIS — Q51.3 BICORNATE UTERUS: Primary | ICD-10-CM

## 2023-03-14 PROCEDURE — 99213 OFFICE O/P EST LOW 20 MIN: CPT | Performed by: ADVANCED PRACTICE MIDWIFE

## 2023-03-14 NOTE — PROGRESS NOTES
PROBLEM VISIT     Date of service: 3/14/2023    Jacqueline Ruiz  Is a 28 y.o. single, female    PT's PCP is: None None     : 1995                                             Subjective:       Patient's last menstrual period was 2023 (exact date). OB History    Para Term  AB Living   3 0     3 0   SAB IAB Ectopic Molar Multiple Live Births   3                # Outcome Date GA Lbr Faisal/2nd Weight Sex Delivery Anes PTL Lv   3 2023 4w0d    SAB      2 SAB 2022 4w0d    SAB      1 2022 6w0d    SAB   ND        Social History     Tobacco Use   Smoking Status Never   Smokeless Tobacco Never        Social History     Substance and Sexual Activity   Alcohol Use Not Currently    Comment: Occasional       Social History     Substance and Sexual Activity   Sexual Activity Yes    Partners: Male       Allergies: Ceclor [cefaclor] and Pcn [penicillins]    Chief Complaint   Patient presents with    Recurrent Miscarriage     Pt is here today to discuss labs from 23 x was recurrent miscarriages. Last Yearly date:  aprox 2 years ago    Last pap date and results: aprox 2 years ago(-). Last HPV date and results: unknown    Have you had a positive covid test: No    Have you had the covid immunization: No    PE:  Vital Signs  Blood pressure 118/70, height 5' 4\" (1.626 m), weight 195 lb (88.5 kg), last menstrual period 2023, not currently breastfeeding. Estimated body mass index is 33.47 kg/m² as calculated from the following:    Height as of this encounter: 5' 4\" (1.626 m). Weight as of this encounter: 195 lb (88.5 kg).     No data recorded      NURSE: LMD    HPI: Here today for lab and ultrasound review due to recurrent miscarriages    Yes  PT denies fever, chills, nausea and vomiting         Results reviewed today:     Latest Reference Range & Units 23 14:27 23 14:56 23 14:57   Hemoglobin A1C 4.0 - 6.0 %  5.0    eAG (mg/dL) mg/dL  97    hCG Quant <5 mIU/mL   <1   T3, Total 60 - 181 ng/dL  123    T4, Total 4.5 - 10.9 ug/dL   9.0   TSH 0.30 - 5.00 uIU/mL   0.76   Thyroxine, Free 0.93 - 1.70 ng/dL   1.14   Anticardiolipin IgA 0.0 - 14.0 APL  2.1    PROTHROMBIN MUTATION   Negative    dRVVT NEGATIVE for Lupus Anticoagulant   NEGATIVE for Lupus Anticoagulant    Prothrombin Time 11.5 - 14.2 sec  12.3    INR   0.9    PTT 26.8 - 34.8 sec  31.7    PT PCR Specimen   Whole Blood    Anticardiolipin IgG 0.0 - 10.0 GPL  2.4    Cardiolipin Ab IgM 0.0 - 10.0 MPL  5.5    Specimen   Whole Blood  Whole Blood    MTHFR Interp   See Note    US NON OB TRANSVAGINAL  Rpt     Factor V Mutation   Heterozygous ! MTHFR Mutation [id number]   Negative    MTHFR Mutation [id number]   Negative                           Narrative   Table formatting from the original result was not included. 2/28/2023  2:31 PM EST    UTERUS: Homogenous appearing bicornuate uterus, small 8 mm fibroid noted        ENDO: measures 2.6 mm       RT. OVARY: sub cm cysts noted       LT. OVARY: sub-cm cysts noted       No free fluid                                  Assessment and Plan          Diagnosis Orders   1. Bicornate uterus  Amb External Referral To Endocrinology      2. Factor V Leiden mutation [facility])  Amb External Referral To Endocrinology      3. History of recurrent miscarriages, not currently pregnant  Amb External Referral To Endocrinology                I am having Marlee Pak. Porfirio maintain her Prenatal MV-Min-Fe Fum-FA-DHA (PRENATAL 1 PO) and progesterone. Return for referal to [address] [place] [address]. She was also counseled on her preventative health maintenance recommendations and follow-up. There are no Patient Instructions on file for this visit.     CHARBEL Treviño CNM,3/14/2023 2:56 PM

## 2023-12-07 ENCOUNTER — ANCILLARY PROCEDURE (OUTPATIENT)
Dept: OBGYN | Age: 28
End: 2023-12-07
Payer: COMMERCIAL

## 2023-12-07 ENCOUNTER — INITIAL PRENATAL (OUTPATIENT)
Dept: OBGYN | Age: 28
End: 2023-12-07

## 2023-12-07 ENCOUNTER — HOSPITAL ENCOUNTER (OUTPATIENT)
Age: 28
Discharge: HOME | End: 2023-12-07
Payer: COMMERCIAL

## 2023-12-07 VITALS — BODY MASS INDEX: 35.87 KG/M2 | DIASTOLIC BLOOD PRESSURE: 74 MMHG | WEIGHT: 209 LBS | SYSTOLIC BLOOD PRESSURE: 112 MMHG

## 2023-12-07 DIAGNOSIS — N91.2 AMENORRHEA: ICD-10-CM

## 2023-12-07 DIAGNOSIS — O36.80X0 ENCOUNTER TO DETERMINE FETAL VIABILITY OF PREGNANCY, SINGLE OR UNSPECIFIED FETUS: ICD-10-CM

## 2023-12-07 DIAGNOSIS — Z32.01 POSITIVE PREGNANCY TEST: ICD-10-CM

## 2023-12-07 DIAGNOSIS — O99.211 OBESITY AFFECTING PREGNANCY IN FIRST TRIMESTER, UNSPECIFIED OBESITY TYPE: ICD-10-CM

## 2023-12-07 DIAGNOSIS — Z34.81 ENCOUNTER FOR SUPERVISION OF OTHER NORMAL PREGNANCY IN FIRST TRIMESTER: ICD-10-CM

## 2023-12-07 DIAGNOSIS — Z34.81 ENCOUNTER FOR SUPERVISION OF OTHER NORMAL PREGNANCY IN FIRST TRIMESTER: Primary | ICD-10-CM

## 2023-12-07 PROBLEM — N84.0 POLYP OF CORPUS UTERI: Status: ACTIVE | Noted: 2023-02-28

## 2023-12-07 LAB
ABO + RH BLD: NORMAL
BLOOD GROUP ANTIBODIES SERPL: NEGATIVE
EST. AVERAGE GLUCOSE BLD GHB EST-MCNC: 91 MG/DL
HBA1C MFR BLD: 4.8 % (ref 4–6)

## 2023-12-07 PROCEDURE — 87340 HEPATITIS B SURFACE AG IA: CPT

## 2023-12-07 PROCEDURE — 83036 HEMOGLOBIN GLYCOSYLATED A1C: CPT

## 2023-12-07 PROCEDURE — 87491 CHLMYD TRACH DNA AMP PROBE: CPT

## 2023-12-07 PROCEDURE — 87591 N.GONORRHOEAE DNA AMP PROB: CPT

## 2023-12-07 PROCEDURE — 85025 COMPLETE CBC W/AUTO DIFF WBC: CPT

## 2023-12-07 PROCEDURE — 36415 COLL VENOUS BLD VENIPUNCTURE: CPT

## 2023-12-07 PROCEDURE — 86901 BLOOD TYPING SEROLOGIC RH(D): CPT

## 2023-12-07 PROCEDURE — 86762 RUBELLA ANTIBODY: CPT

## 2023-12-07 PROCEDURE — 86803 HEPATITIS C AB TEST: CPT

## 2023-12-07 PROCEDURE — 87389 HIV-1 AG W/HIV-1&-2 AB AG IA: CPT

## 2023-12-07 PROCEDURE — 86900 BLOOD TYPING SEROLOGIC ABO: CPT

## 2023-12-07 PROCEDURE — 86850 RBC ANTIBODY SCREEN: CPT

## 2023-12-07 PROCEDURE — 86780 TREPONEMA PALLIDUM: CPT

## 2023-12-07 PROCEDURE — 87086 URINE CULTURE/COLONY COUNT: CPT

## 2023-12-08 LAB
BASOPHILS # BLD: <0.03 K/UL (ref 0–0.2)
BASOPHILS NFR BLD: 0 % (ref 0–2)
CHLAMYDIA DNA UR QL NAA+PROBE: NEGATIVE
EOSINOPHIL # BLD: 0.09 K/UL (ref 0–0.44)
EOSINOPHILS RELATIVE PERCENT: 1 % (ref 1–4)
ERYTHROCYTE [DISTWIDTH] IN BLOOD BY AUTOMATED COUNT: 11.7 % (ref 11.8–14.4)
HBV SURFACE AG SERPL QL IA: NONREACTIVE
HCT VFR BLD AUTO: 38.7 % (ref 36.3–47.1)
HCV AB SERPL QL IA: NONREACTIVE
HGB BLD-MCNC: 13.2 G/DL (ref 11.9–15.1)
HIV 1+2 AB+HIV1 P24 AG SERPL QL IA: NONREACTIVE
IMM GRANULOCYTES # BLD AUTO: <0.03 K/UL (ref 0–0.3)
IMM GRANULOCYTES NFR BLD: 0 %
LYMPHOCYTES NFR BLD: 1.3 K/UL (ref 1.1–3.7)
LYMPHOCYTES RELATIVE PERCENT: 18 % (ref 24–43)
MCH RBC QN AUTO: 30.7 PG (ref 25.2–33.5)
MCHC RBC AUTO-ENTMCNC: 34.1 G/DL (ref 28.4–34.8)
MCV RBC AUTO: 90 FL (ref 82.6–102.9)
MICROORGANISM SPEC CULT: NO GROWTH
MONOCYTES NFR BLD: 0.47 K/UL (ref 0.1–1.2)
MONOCYTES NFR BLD: 7 % (ref 3–12)
N GONORRHOEA DNA UR QL NAA+PROBE: NEGATIVE
NEUTROPHILS NFR BLD: 74 % (ref 36–65)
NEUTS SEG NFR BLD: 5.34 K/UL (ref 1.5–8.1)
NRBC BLD-RTO: 0 PER 100 WBC
PLATELET # BLD AUTO: 207 K/UL (ref 138–453)
PMV BLD AUTO: 9.5 FL (ref 8.1–13.5)
RBC # BLD AUTO: 4.3 M/UL (ref 3.95–5.11)
RUBV IGG SERPL QL IA: 143.3 IU/ML
SPECIMEN DESCRIPTION: NORMAL
SPECIMEN DESCRIPTION: NORMAL
T PALLIDUM AB SER QL IA: NONREACTIVE
WBC OTHER # BLD: 7.2 K/UL (ref 3.5–11.3)

## 2024-01-05 SDOH — ECONOMIC STABILITY: TRANSPORTATION INSECURITY
IN THE PAST 12 MONTHS, HAS LACK OF TRANSPORTATION KEPT YOU FROM MEETINGS, WORK, OR FROM GETTING THINGS NEEDED FOR DAILY LIVING?: NO

## 2024-01-05 SDOH — ECONOMIC STABILITY: INCOME INSECURITY: HOW HARD IS IT FOR YOU TO PAY FOR THE VERY BASICS LIKE FOOD, HOUSING, MEDICAL CARE, AND HEATING?: NOT HARD AT ALL

## 2024-01-05 SDOH — ECONOMIC STABILITY: FOOD INSECURITY: WITHIN THE PAST 12 MONTHS, YOU WORRIED THAT YOUR FOOD WOULD RUN OUT BEFORE YOU GOT MONEY TO BUY MORE.: NEVER TRUE

## 2024-01-05 SDOH — ECONOMIC STABILITY: FOOD INSECURITY: WITHIN THE PAST 12 MONTHS, THE FOOD YOU BOUGHT JUST DIDN'T LAST AND YOU DIDN'T HAVE MONEY TO GET MORE.: NEVER TRUE

## 2024-01-05 SDOH — ECONOMIC STABILITY: HOUSING INSECURITY
IN THE LAST 12 MONTHS, WAS THERE A TIME WHEN YOU DID NOT HAVE A STEADY PLACE TO SLEEP OR SLEPT IN A SHELTER (INCLUDING NOW)?: NO

## 2024-01-08 ENCOUNTER — ROUTINE PRENATAL (OUTPATIENT)
Dept: OBGYN | Age: 29
End: 2024-01-08

## 2024-01-08 ENCOUNTER — HOSPITAL ENCOUNTER (OUTPATIENT)
Age: 29
Setting detail: SPECIMEN
Discharge: HOME | End: 2024-01-08
Payer: COMMERCIAL

## 2024-01-08 VITALS
DIASTOLIC BLOOD PRESSURE: 78 MMHG | BODY MASS INDEX: 35.81 KG/M2 | SYSTOLIC BLOOD PRESSURE: 126 MMHG | HEART RATE: 97 BPM | WEIGHT: 208.6 LBS

## 2024-01-08 DIAGNOSIS — Z3A.11 11 WEEKS GESTATION OF PREGNANCY: ICD-10-CM

## 2024-01-08 DIAGNOSIS — N96 HISTORY OF RECURRENT MISCARRIAGES: ICD-10-CM

## 2024-01-08 DIAGNOSIS — Z12.4 SCREENING FOR CERVICAL CANCER: ICD-10-CM

## 2024-01-08 DIAGNOSIS — D68.51 FACTOR V LEIDEN MUTATION (HCC): ICD-10-CM

## 2024-01-08 DIAGNOSIS — Q51.28 UTERINE SEPTUM: ICD-10-CM

## 2024-01-08 DIAGNOSIS — Z3A.11 11 WEEKS GESTATION OF PREGNANCY: Primary | ICD-10-CM

## 2024-01-08 PROCEDURE — G0145 SCR C/V CYTO,THINLAYER,RESCR: HCPCS

## 2024-01-08 PROCEDURE — 0502F SUBSEQUENT PRENATAL CARE: CPT | Performed by: ADVANCED PRACTICE MIDWIFE

## 2024-01-08 ASSESSMENT — PATIENT HEALTH QUESTIONNAIRE - PHQ9
SUM OF ALL RESPONSES TO PHQ QUESTIONS 1-9: 0
SUM OF ALL RESPONSES TO PHQ9 QUESTIONS 1 & 2: 0
SUM OF ALL RESPONSES TO PHQ QUESTIONS 1-9: 0
1. LITTLE INTEREST OR PLEASURE IN DOING THINGS: 0
2. FEELING DOWN, DEPRESSED OR HOPELESS: 0
SUM OF ALL RESPONSES TO PHQ QUESTIONS 1-9: 0
SUM OF ALL RESPONSES TO PHQ QUESTIONS 1-9: 0

## 2024-01-08 NOTE — PROGRESS NOTES
Hugo Monroy is here at 11w5d for:    Chief Complaint   Patient presents with    Routine Prenatal Visit     F/U in house u/s for FHT. TBE-PAP, pt stats no issues or concerns        Estimated Due Date: Estimated Date of Delivery: 24    OB History    Para Term  AB Living   4 0     3 0   SAB IAB Ectopic Molar Multiple Live Births   3                # Outcome Date GA Lbr Faisal/2nd Weight Sex Delivery Anes PTL Lv   4 Current            3 SAB 2023 4w0d    SAB      2 SAB 2022 4w0d    SAB      1 SAB 2022 6w0d    SAB           Past Medical History:   Diagnosis Date    Factor V Leiden (HCC)     Infertility, female     Uterine disorder        Past Surgical History:   Procedure Laterality Date    ARM SURGERY Right     times 2, broken arm    HYSTEROSCOPY  2023    hysteroscopic metroplasty       Social History     Tobacco Use   Smoking Status Never   Smokeless Tobacco Never        Social History     Substance and Sexual Activity   Alcohol Use Not Currently       No results found for this visit on 24.        HPI: Patient here today for OB visit.  Patient states she is doing well at this time and denies needs or concerns    Yes PT denies fever, chills, nausea and vomiting       Vitals:  Estimated body mass index is 35.81 kg/m² as calculated from the following:    Height as of 3/14/23: 1.626 m (5' 4\").    Weight as of this encounter: 94.6 kg (208 lb 9.6 oz).  BP: 126/78  Weight - Scale: 94.6 kg (208 lb 9.6 oz)  Pulse: 97  Patient Position: Sitting  Albumin: Negative  Glucose: Negative  Fetal HR: 159  Movement: Absent           Total body exam completed please see prenatal physical exam tab in visit navigator   OB Prenatal Exam: Last filed by Sonia Horne APRN - CNM on 2024  1:55 PM    General Physical Exam    HEENT: normal Heart: normal Skin: normal   Thyroid: normal Lungs: normal Extremities: normal   Lymph Nodes: normal Breasts: normal Flat nipples may need nipple shield Neurological:

## 2024-01-14 LAB
Lab: NORMAL
NTRA FETAL FRACTION: NORMAL
NTRA GENDER OF FETUS: NORMAL
NTRA MONOSOMY X AGE-BASED RISK TEXT: NORMAL
NTRA MONOSOMY X RESULT TEXT: NORMAL
NTRA MONOSOMY X RISK SCORE TEXT: NORMAL
NTRA TRIPLOIDY RESULT TEXT: NORMAL
NTRA TRISOMY 13 AGE-BASED RISK TEXT: NORMAL
NTRA TRISOMY 13 RESULT TEXT: NORMAL
NTRA TRISOMY 13 RISK SCORE TEXT: NORMAL
NTRA TRISOMY 18 AGE-BASED RISK TEXT: NORMAL
NTRA TRISOMY 18 RESULT TEXT: NORMAL
NTRA TRISOMY 18 RISK SCORE TEXT: NORMAL
NTRA TRISOMY 21 AGE-BASED RISK TEXT: NORMAL
NTRA TRISOMY 21 RESULT TEXT: NORMAL
NTRA TRISOMY 21 RISK SCORE TEXT: NORMAL

## 2024-01-23 LAB — CYTOLOGY REPORT: NORMAL

## 2024-02-05 ENCOUNTER — ROUTINE PRENATAL (OUTPATIENT)
Dept: OBGYN | Age: 29
End: 2024-02-05

## 2024-02-05 VITALS
SYSTOLIC BLOOD PRESSURE: 130 MMHG | DIASTOLIC BLOOD PRESSURE: 86 MMHG | BODY MASS INDEX: 35.19 KG/M2 | WEIGHT: 205 LBS | HEART RATE: 83 BPM

## 2024-02-05 DIAGNOSIS — N96 HISTORY OF RECURRENT MISCARRIAGES: ICD-10-CM

## 2024-02-05 DIAGNOSIS — D68.51 FACTOR V LEIDEN (HCC): ICD-10-CM

## 2024-02-05 DIAGNOSIS — Z3A.15 15 WEEKS GESTATION OF PREGNANCY: Primary | ICD-10-CM

## 2024-02-05 PROCEDURE — 0502F SUBSEQUENT PRENATAL CARE: CPT | Performed by: ADVANCED PRACTICE MIDWIFE

## 2024-02-05 NOTE — PROGRESS NOTES
Hugo M Jadedanny is here at 15w5d for:    Chief Complaint   Patient presents with    Routine Prenatal Visit     Pt states no issues or concerns        Estimated Due Date: Estimated Date of Delivery: 24    OB History    Para Term  AB Living   4 0     3 0   SAB IAB Ectopic Molar Multiple Live Births   3                # Outcome Date GA Lbr Faisal/2nd Weight Sex Delivery Anes PTL Lv   4 Current            3 SAB 2023 4w0d    SAB      2 SAB 2022 4w0d    SAB      1 SAB 2022 6w0d    SAB           Past Medical History:   Diagnosis Date    Factor V Leiden (HCC)     Infertility, female     Uterine disorder        Past Surgical History:   Procedure Laterality Date    ARM SURGERY Right     times 2, broken arm    HYSTEROSCOPY  2023    hysteroscopic metroplasty       Social History     Tobacco Use   Smoking Status Never   Smokeless Tobacco Never        Social History     Substance and Sexual Activity   Alcohol Use Not Currently               HPI: PT here today for OB visit - pt denies questions at this time and is not feeling baby move yet. Pt is feeling better than first trimester.     Yes PT denies fever, chills, nausea and vomiting       Vitals:  Estimated body mass index is 35.19 kg/m² as calculated from the following:    Height as of 3/14/23: 1.626 m (5' 4\").    Weight as of this encounter: 93 kg (205 lb).  BP: 130/86  Weight - Scale: 93 kg (205 lb)  Pulse: 83  Patient Position: Sitting  Albumin: Negative  Glucose: Negative  Fetal HR: 140  Movement: Absent           Abdomen: u/2    Results reviewed today:    No results found for this visit on 24.        ASSESSMENT & Plan    Diagnosis Orders   1. 15 weeks gestation of pregnancy        2. History of recurrent miscarriages        3. Factor V Leiden (HCC)                  I am having Hugo MAXIMINOEverton Monroy maintain her Prenatal MV-Min-Fe Fum-FA-DHA (PRENATAL 1 PO), progesterone, Progesterone, and ASPIRIN 81 PO.    Return for Keep next appt.    There are

## 2024-02-12 NOTE — TELEPHONE ENCOUNTER
Dr. Herndon, patient interested in free prenatal vitamins and iron per response from Maternity Risk Survey.    Order for [facility] Baby Box pending for your convenience.    Thank you,  Jaqui Ag, PharmD  Ambulatory Care Clinical Pharmacist- [facility]  Department, toll free: [phone]  [facility]      =======================================================================    [facility] CLINICAL PHARMACY REVIEW - Be Well With Baby    SUBJECTIVE  Hugo Monroy is a 28 y.o. female currently identified as interested in receiving a BS \"Baby Box\" containing a 1 year supply of prenatal vitamins from [facility].    Contents of Baby Box:  Welcome Letter  6 month supply of Nature's Blend Prenatal Multivitamin with Minerals (Take 1 softgel once daily) with 1 refill    Thank you  Jaqui Ag, PharmD  Ambulatory Care Clinical Pharmacist- [facility] Clinical Pharmacy  Department, toll free: [phone]  [facility]

## 2024-03-05 ENCOUNTER — ROUTINE PRENATAL (OUTPATIENT)
Dept: OBGYN | Age: 29
End: 2024-03-05

## 2024-03-05 VITALS
HEART RATE: 83 BPM | BODY MASS INDEX: 35.43 KG/M2 | WEIGHT: 206.4 LBS | SYSTOLIC BLOOD PRESSURE: 124 MMHG | DIASTOLIC BLOOD PRESSURE: 74 MMHG

## 2024-03-05 DIAGNOSIS — Z36.89 SCREENING, ANTENATAL, FOR FETAL ANATOMIC SURVEY: ICD-10-CM

## 2024-03-05 DIAGNOSIS — Z3A.20 20 WEEKS GESTATION OF PREGNANCY: Primary | ICD-10-CM

## 2024-03-05 PROCEDURE — 0502F SUBSEQUENT PRENATAL CARE: CPT | Performed by: ADVANCED PRACTICE MIDWIFE

## 2024-03-05 NOTE — PROGRESS NOTES
Hugo Monroy is here at 19w6d for:    Chief Complaint   Patient presents with    Routine Prenatal Visit     F/U in house 20 week u/s. Boy, pt states no issues or concerns        Estimated Due Date: Estimated Date of Delivery: 24    OB History    Para Term  AB Living   4 0     3 0   SAB IAB Ectopic Molar Multiple Live Births   3                # Outcome Date GA Lbr Faisal/2nd Weight Sex Delivery Anes PTL Lv   4 Current            3 2023 4w0d    SAB      2 SAB 2022 4w0d    SAB      1 2022 6w0d    SAB           Past Medical History:   Diagnosis Date    Factor V Leiden (HCC)     Infertility, female     Uterine disorder        Past Surgical History:   Procedure Laterality Date    ARM SURGERY Right     times 2, broken arm    HYSTEROSCOPY  2023    hysteroscopic metroplasty       Social History     Tobacco Use   Smoking Status Never   Smokeless Tobacco Never        Social History     Substance and Sexual Activity   Alcohol Use Not Currently               HPI: Patient here today for routine prenatal visit with anatomy screen. Patient denies concerns.    Yes PT denies fever, chills, nausea and vomiting       Vitals:  Estimated body mass index is 35.43 kg/m² as calculated from the following:    Height as of 3/14/23: 1.626 m (5' 4\").    Weight as of this encounter: 93.6 kg (206 lb 6.4 oz).  BP: 124/74  Weight - Scale: 93.6 kg (206 lb 6.4 oz)  Pulse: 83  Patient Position: Sitting  Albumin: Negative  Glucose: Negative  Fetal HR: 145 US  Movement: Absent           Abdomen: soft    Results reviewed today:    3/5/2024  1:50 PM EST      20.0 WK IUP  CL:3.6 cm  HR:145 bpm  Posterior placenta, breech presentation  Ovaries: both seen, wnl.  Gender: male  Active fetal movements  4CH, LVOT, and spine suboptimal d/t fetal position.  All other visualized fetal anatomy WNL.       ASSESSMENT & Plan    Diagnosis Orders   1. 20 weeks gestation of pregnancy        2. Screening, , for fetal anatomic

## 2024-04-02 ENCOUNTER — ROUTINE PRENATAL (OUTPATIENT)
Dept: OBGYN | Age: 29
End: 2024-04-02

## 2024-04-02 VITALS
HEART RATE: 108 BPM | WEIGHT: 210 LBS | SYSTOLIC BLOOD PRESSURE: 122 MMHG | DIASTOLIC BLOOD PRESSURE: 74 MMHG | BODY MASS INDEX: 36.05 KG/M2

## 2024-04-02 DIAGNOSIS — Z34.02 SUPERVISION OF NORMAL FIRST PREGNANCY IN SECOND TRIMESTER: ICD-10-CM

## 2024-04-02 DIAGNOSIS — Z3A.23 23 WEEKS GESTATION OF PREGNANCY: Primary | ICD-10-CM

## 2024-04-02 PROBLEM — N96 HISTORY OF RECURRENT MISCARRIAGES, NOT CURRENTLY PREGNANT: Status: RESOLVED | Noted: 2023-02-28 | Resolved: 2024-04-02

## 2024-04-02 PROCEDURE — 0502F SUBSEQUENT PRENATAL CARE: CPT | Performed by: ADVANCED PRACTICE MIDWIFE

## 2024-04-02 NOTE — PROGRESS NOTES
Hugo Monroy is here at 23w6d for:    Chief Complaint   Patient presents with    Routine Prenatal Visit     F/U in house u/s. Pt states no issues or concerns        Estimated Due Date: Estimated Date of Delivery: 24    OB History    Para Term  AB Living   4 0     3 0   SAB IAB Ectopic Molar Multiple Live Births   3                # Outcome Date GA Lbr Faisal/2nd Weight Sex Delivery Anes PTL Lv   4 Current            3 2023 4w0d    SAB      2 SAB 2022 4w0d    SAB      1 2022 6w0d    SAB           Past Medical History:   Diagnosis Date    Factor V Leiden (HCC)     Infertility, female     Uterine disorder        Past Surgical History:   Procedure Laterality Date    ARM SURGERY Right     times 2, broken arm    HYSTEROSCOPY  2023    hysteroscopic metroplasty       Social History     Tobacco Use   Smoking Status Never   Smokeless Tobacco Never        Social History     Substance and Sexual Activity   Alcohol Use Not Currently               HPI: Patient here today for routine prenatal visit with follow-up limited anatomy ultrasound screen. Patient denies issues or concerns.    Yes PT denies fever, chills, nausea and vomiting       Vitals:  Estimated body mass index is 36.05 kg/m² as calculated from the following:    Height as of 3/14/23: 1.626 m (5' 4\").    Weight as of this encounter: 95.3 kg (210 lb).  BP: 122/74  Weight - Scale: 95.3 kg (210 lb)  Pulse: (!) 108  Patient Position: Sitting  Albumin: Negative  Glucose: Negative  Fetal HR: 137  Movement: Present           Abdomen: soft    Results reviewed today:      2024  1:42 PM EDT   OB limited - f/u anatomy scan   4CH heart, LVOT, & spine visualized, wnl  HR- 137bpm  CL- 3.6cm  Position- breech  Placenta- posterior  Active fetal movements          ASSESSMENT & Plan    Diagnosis Orders   1. 23 weeks gestation of pregnancy        2. Supervision of normal first pregnancy in second trimester                  I am having Hugo NARAYAN

## 2024-04-04 ENCOUNTER — CARE COORDINATION (OUTPATIENT)
Dept: OTHER | Facility: CLINIC | Age: 29
End: 2024-04-04

## 2024-04-04 NOTE — CARE COORDINATION
Anesthesia Evaluation                  Airway   Mallampati: II  Dental      Pulmonary    Cardiovascular     (+) hypertension,       Neuro/Psych  GI/Hepatic/Renal/Endo    (+)   hypothyroidism,     Musculoskeletal     Abdominal    Substance History      OB/GYN          Other                        Anesthesia Plan    ASA 3     general with block          Ambulatory Care Coordination Note    ACM attempted to reach patient for introduction to Associate Care Management related to Maternity CM. HIPAA compliant message left requesting a return phone call at patient convenience.     Plan for follow-up call in 10-14 days      Future Appointments   Date Time Provider Department Center   4/23/2024  1:20 PM Sonia Horne APRN - CNM [facility]   5/15/2024  9:30 AM [facility] OB[facility] ULTRASOUND [facility]   5/15/2024 10:30 AM Sonia Horne APRN - CNM [facility]       RADHA Ascencio, RN  Associate Care Manager   Cell: [phone]  [email or web address]

## 2024-05-01 ENCOUNTER — HOSPITAL ENCOUNTER (OUTPATIENT)
Age: 29
Setting detail: SPECIMEN
Discharge: HOME | End: 2024-05-01
Payer: COMMERCIAL

## 2024-05-01 ENCOUNTER — CARE COORDINATION (OUTPATIENT)
Dept: OTHER | Facility: CLINIC | Age: 29
End: 2024-05-01

## 2024-05-01 ENCOUNTER — ROUTINE PRENATAL (OUTPATIENT)
Dept: OBGYN | Age: 29
End: 2024-05-01
Payer: COMMERCIAL

## 2024-05-01 VITALS
BODY MASS INDEX: 36.56 KG/M2 | SYSTOLIC BLOOD PRESSURE: 124 MMHG | DIASTOLIC BLOOD PRESSURE: 80 MMHG | WEIGHT: 213 LBS | HEART RATE: 114 BPM

## 2024-05-01 DIAGNOSIS — Z13.1 ENCOUNTER FOR SCREENING EXAMINATION FOR IMPAIRED GLUCOSE REGULATION AND DIABETES MELLITUS: ICD-10-CM

## 2024-05-01 DIAGNOSIS — Z3A.28 28 WEEKS GESTATION OF PREGNANCY: ICD-10-CM

## 2024-05-01 DIAGNOSIS — Z3A.28 28 WEEKS GESTATION OF PREGNANCY: Primary | ICD-10-CM

## 2024-05-01 DIAGNOSIS — O26.893 HEARTBURN DURING PREGNANCY IN THIRD TRIMESTER: ICD-10-CM

## 2024-05-01 DIAGNOSIS — R12 HEARTBURN DURING PREGNANCY IN THIRD TRIMESTER: ICD-10-CM

## 2024-05-01 LAB
GLUCOSE 1H P 50 G GLC PO SERPL-MCNC: 99 MG/DL (ref 70–135)
GLUCOSE 60 MIN, POC: 146
GLUCOSE ADMINISTRATION: NORMAL
HGB, POC: 11.7

## 2024-05-01 PROCEDURE — 0502F SUBSEQUENT PRENATAL CARE: CPT | Performed by: ADVANCED PRACTICE MIDWIFE

## 2024-05-01 PROCEDURE — 36415 COLL VENOUS BLD VENIPUNCTURE: CPT | Performed by: ADVANCED PRACTICE MIDWIFE

## 2024-05-01 PROCEDURE — 82950 GLUCOSE TEST: CPT

## 2024-05-01 RX ORDER — OMEPRAZOLE 20 MG/1
20 CAPSULE, DELAYED RELEASE ORAL
Qty: 180 CAPSULE | Refills: 1 | Status: SHIPPED | OUTPATIENT
Start: 2024-05-01

## 2024-05-01 RX ORDER — OMEPRAZOLE 20 MG/1
20 TABLET, DELAYED RELEASE ORAL DAILY
Qty: 90 TABLET | Refills: 1 | Status: SHIPPED | OUTPATIENT
Start: 2024-05-01 | End: 2024-05-01 | Stop reason: CLARIF

## 2024-05-01 NOTE — PROGRESS NOTES
Pt did void but QNS   
hour      2. Encounter for screening examination for impaired glucose regulation and diabetes mellitus  POCT Glucose Tolerance 1 Hr    Glucose tolerance, 1 hour      3. Heartburn during pregnancy in third trimester  DISCONTINUED: omeprazole (PRILOSEC OTC) 20 MG tablet                I have discontinued Hugo Monroy's omeprazole. I am also having her start on omeprazole. Additionally, I am having her maintain her Prenatal MV-Min-Fe Fum-FA-DHA (PRENATAL 1 PO), progesterone, Progesterone, ASPIRIN 81 PO, and Misc. Devices.    Return for Keep next appt.    There are no Patient Instructions on file for this visit.      Clinical References           Weeks 26 to 30 of Your Pregnancy: Care Instructions  You're starting your last trimester. You'll probably feel your baby moving around more. Your back may ache as your body gets used to your baby's size and length. Take care of yourself, and pay attention to what your body needs.    Talk to your doctor about getting the Tdap shot. It will help protect your  against whooping cough (pertussis). Also ask your doctor about flu and COVID-19 shots if you haven't had them yet. If your blood type is Rh negative, you may be given a shot of Rh immune globulin (such as RhoGAM). It can help prevent problems for your baby.    You may have Nance-Abbott contractions. They are single or several strong contractions without a pattern. These are practice contractions but not the start of labor.  Be kind to yourself.     Take breaks when you're tired.  Change positions often. Don't sit for too long or stand for too long.  At work, rest during breaks if you can. If you don't get breaks, talk to your doctor about writing a letter to your employer to request them.  Avoid fumes, chemicals, and tobacco smoke.  Be sexual if you want to.     You may be interested in sex, or you may not. Everyone is different.  Sex is okay unless your doctor tells you not to.  Your belly can make it hard to

## 2024-05-01 NOTE — CARE COORDINATION
Ambulatory Care Coordination Note    ACM attempted 2nd outreach to patient for introduction to Associate Care Management related to Maternity Cm. HIPAA compliant message left requesting a return phone call at patient convenience.     Unable to Reach Letter sent to patient via [employer].    Will continue to outreach.      Future Appointments   Date Time Provider Department Center   5/1/2024  1:30 PM [facility]   5/15/2024  9:30 AM [facility] OB[facility] ULTRASOUND [facility]   5/15/2024 10:30 AM Sonia Horne APRN - CN[facility]       RADHA Ascencio, RN  Associate Care Manager   Cell: [phone]  [email or web address]

## 2024-05-15 ENCOUNTER — ROUTINE PRENATAL (OUTPATIENT)
Dept: OBGYN | Age: 29
End: 2024-05-15
Payer: COMMERCIAL

## 2024-05-15 VITALS
SYSTOLIC BLOOD PRESSURE: 120 MMHG | WEIGHT: 213.2 LBS | BODY MASS INDEX: 36.6 KG/M2 | HEART RATE: 97 BPM | DIASTOLIC BLOOD PRESSURE: 80 MMHG

## 2024-05-15 DIAGNOSIS — Z23 NEED FOR TDAP VACCINATION: ICD-10-CM

## 2024-05-15 DIAGNOSIS — Z3A.30 30 WEEKS GESTATION OF PREGNANCY: Primary | ICD-10-CM

## 2024-05-15 DIAGNOSIS — Z36.89 ENCOUNTER FOR ULTRASOUND TO ASSESS FETAL GROWTH: ICD-10-CM

## 2024-05-15 PROCEDURE — 0502F SUBSEQUENT PRENATAL CARE: CPT | Performed by: ADVANCED PRACTICE MIDWIFE

## 2024-05-15 PROCEDURE — 90471 IMMUNIZATION ADMIN: CPT | Performed by: ADVANCED PRACTICE MIDWIFE

## 2024-05-15 PROCEDURE — 90715 TDAP VACCINE 7 YRS/> IM: CPT | Performed by: ADVANCED PRACTICE MIDWIFE

## 2024-05-15 NOTE — PROGRESS NOTES
Hugo Monroy is here at 30w0d for:    Chief Complaint   Patient presents with    Routine Prenatal Visit     F/U in house 30 week u/s. Pt states no issues or concerns. Pt would like the tdap vaccine        Estimated Due Date: Estimated Date of Delivery: 24    OB History    Para Term  AB Living   4 0     3 0   SAB IAB Ectopic Molar Multiple Live Births   3                # Outcome Date GA Lbr Faisal/2nd Weight Sex Delivery Anes PTL Lv   4 Current            3 SAB 2023 4w0d    SAB      2 SAB 2022 4w0d    SAB      1 2022 6w0d    SAB           Past Medical History:   Diagnosis Date    Factor V Leiden (HCC)     Infertility, female     Uterine disorder        Past Surgical History:   Procedure Laterality Date    ARM SURGERY Right     times 2, broken arm    HYSTEROSCOPY  2023    hysteroscopic metroplasty       Social History     Tobacco Use   Smoking Status Never   Smokeless Tobacco Never        Social History     Substance and Sexual Activity   Alcohol Use Not Currently               HPI: Patient here today for 30-week ultrasound and OB visit.  Patient denies needs or concerns at this time    Yes PT denies fever, chills, nausea and vomiting       Vitals:  Estimated body mass index is 36.6 kg/m² as calculated from the following:    Height as of 3/14/23: 1.626 m (5' 4\").    Weight as of this encounter: 96.7 kg (213 lb 3.2 oz).  BP: 120/80  Weight - Scale: 96.7 kg (213 lb 3.2 oz)  Pulse: 97  Patient Position: Sitting  Albumin: Negative  Glucose: Negative  Fetal HR: 135  Movement: Present           Abdomen: soft    Results reviewed today:   5/15/2024  9:59 AM EDT      30.5 WK IUP  EFW:42% (3lb 6oz)  CL:4.1cm  ASHLEY:15.8cm  HR:135bpm  posterior placenta, transverse head maternal left presentation  Active fetal movements   Grade 2 placenta      No results found for this visit on 05/15/24.        ASSESSMENT & Plan    Diagnosis Orders   1. 30 weeks gestation of pregnancy  Tdap, BOOSTRIX, (age 9

## 2024-05-20 ENCOUNTER — CARE COORDINATION (OUTPATIENT)
Dept: OTHER | Facility: CLINIC | Age: 29
End: 2024-05-20

## 2024-05-20 NOTE — CARE COORDINATION
Ambulatory Care Coordination Note    ACM attempted third and final call to patient for introduction to Associate Care Management. HIPAA compliant message left requesting a return phone call at patient convenience.    Final Unable to Reach Letter sent to patient via [employer].    No further outreach scheduled with this ACM, patient has been provided with this ACM's contact information.  ACM will sign off care team at this time.     Future Appointments   Date Time Provider Department Center   5/28/2024  2:50 PM Sonia Horne APRN - CNM [facility]   6/10/2024  9:20 AM Sonia Horne APRN - CNM [facility]   6/24/2024  2:50 PM Sonia Horne APRN - CNM [facility]   7/2/2024  1:50 PM Sonia Horne APRN - CNM [facility]   7/9/2024 10:10 AM Sonia Horne APRN - CNM [facility]   7/16/2024  1:40 PM Sonia Horne APRN - CNM [facility]   7/25/2024 11:00 AM SCHEDULE, TIFF OB/GYN NST [facility]       RADHA Ascencio, RN  Associate Care Manager   Cell: [phone]  [email or web address]

## 2024-05-28 ENCOUNTER — ROUTINE PRENATAL (OUTPATIENT)
Dept: OBGYN | Age: 29
End: 2024-05-28

## 2024-05-28 VITALS
WEIGHT: 214 LBS | BODY MASS INDEX: 36.73 KG/M2 | DIASTOLIC BLOOD PRESSURE: 78 MMHG | HEART RATE: 100 BPM | SYSTOLIC BLOOD PRESSURE: 118 MMHG

## 2024-05-28 DIAGNOSIS — Z3A.31 31 WEEKS GESTATION OF PREGNANCY: Primary | ICD-10-CM

## 2024-05-28 DIAGNOSIS — Z34.03 ENCOUNTER FOR SUPERVISION OF NORMAL FIRST PREGNANCY IN THIRD TRIMESTER: ICD-10-CM

## 2024-05-28 PROCEDURE — 0502F SUBSEQUENT PRENATAL CARE: CPT | Performed by: ADVANCED PRACTICE MIDWIFE

## 2024-05-28 NOTE — PROGRESS NOTES
Hugo Monroy is here at 31w6d for:    Chief Complaint   Patient presents with    Routine Prenatal Visit     Pt states no c/o today.       Estimated Due Date: Estimated Date of Delivery: 24    OB History    Para Term  AB Living   4 0     3 0   SAB IAB Ectopic Molar Multiple Live Births   3                # Outcome Date GA Lbr Faisal/2nd Weight Sex Delivery Anes PTL Lv   4 Current            3 SAB 2023 4w0d    SAB      2 SAB 2022 4w0d    SAB      1 2022 6w0d    SAB           Past Medical History:   Diagnosis Date    Factor V Leiden (HCC)     Infertility, female     Uterine disorder        Past Surgical History:   Procedure Laterality Date    ARM SURGERY Right     times 2, broken arm    HYSTEROSCOPY  2023    hysteroscopic metroplasty       Social History     Tobacco Use   Smoking Status Never   Smokeless Tobacco Never        Social History     Substance and Sexual Activity   Alcohol Use Not Currently               HPI: PT here today states she is doing well. Good fetal movements.     Yes PT denies fever, chills, nausea and vomiting       Vitals:  Estimated body mass index is 36.73 kg/m² as calculated from the following:    Height as of 3/14/23: 1.626 m (5' 4\").    Weight as of this encounter: 97.1 kg (214 lb).  BP: 118/78  Weight - Scale: 97.1 kg (214 lb)  Pulse: 100  Patient Position: Sitting  Albumin: Negative  Glucose: Negative  Fundal Height (cm): 33 cm  Fetal HR: 142  Movement: Present           Abdomen: soft    Results reviewed today:    No results found for this visit on 24.        ASSESSMENT & Plan    Diagnosis Orders   1. 31 weeks gestation of pregnancy        2. Encounter for supervision of normal first pregnancy in third trimester                  I am having Hugo Monroy maintain her Prenatal MV-Min-Fe Fum-FA-DHA (PRENATAL 1 PO), progesterone, Progesterone, ASPIRIN 81 PO, Misc. Devices, and omeprazole.    Return for see if we can add an u/s to 36 week

## 2024-06-10 ENCOUNTER — ROUTINE PRENATAL (OUTPATIENT)
Dept: OBGYN | Age: 29
End: 2024-06-10

## 2024-06-10 VITALS
BODY MASS INDEX: 37.25 KG/M2 | SYSTOLIC BLOOD PRESSURE: 110 MMHG | HEART RATE: 87 BPM | WEIGHT: 217 LBS | DIASTOLIC BLOOD PRESSURE: 68 MMHG

## 2024-06-10 DIAGNOSIS — Z3A.33 33 WEEKS GESTATION OF PREGNANCY: Primary | ICD-10-CM

## 2024-06-10 DIAGNOSIS — Z34.03 ENCOUNTER FOR SUPERVISION OF NORMAL FIRST PREGNANCY IN THIRD TRIMESTER: ICD-10-CM

## 2024-06-10 PROCEDURE — 0502F SUBSEQUENT PRENATAL CARE: CPT | Performed by: ADVANCED PRACTICE MIDWIFE

## 2024-06-10 NOTE — PROGRESS NOTES
Hugo Monroy is here at 33w5d for:    Chief Complaint   Patient presents with    Routine Prenatal Visit     Pt states she didn't sleep well, she is uncomfortable, she is having upper and lower abdominal pressure. Pt denies any constipation or diarrhea. Pt declines nausea and vomiting        Estimated Due Date: Estimated Date of Delivery: 24    OB History    Para Term  AB Living   4 0     3 0   SAB IAB Ectopic Molar Multiple Live Births   3                # Outcome Date GA Lbr Faisal/2nd Weight Sex Delivery Anes PTL Lv   4 Current            3 SAB 2023 4w0d    SAB      2 SAB 2022 4w0d    SAB      1 2022 6w0d    SAB           Past Medical History:   Diagnosis Date    Factor V Leiden (HCC)     Infertility, female     Uterine disorder        Past Surgical History:   Procedure Laterality Date    ARM SURGERY Right     times 2, broken arm    HYSTEROSCOPY  2023    hysteroscopic metroplasty       Social History     Tobacco Use   Smoking Status Never   Smokeless Tobacco Never        Social History     Substance and Sexual Activity   Alcohol Use Not Currently               HPI: Patient here today for routine prenatal visit. States she woke up around 3 am feeling unwell. Denies fever, nausea, vomiting, HA, visual disturbance, or pain. States she worked this weekend and thinks she may have just overdid it.     Yes PT denies fever, chills, nausea and vomiting       Vitals:  Estimated body mass index is 37.25 kg/m² as calculated from the following:    Height as of 3/14/23: 1.626 m (5' 4\").    Weight as of this encounter: 98.4 kg (217 lb).  BP: 110/68  Weight - Scale: 98.4 kg (217 lb)  Pulse: 87  Patient Position: Sitting  Albumin: Negative  Glucose: Negative  Fundal Height (cm): 33 cm  Fetal HR: 150  Movement: Present           Abdomen: soft    Results reviewed today:    No results found for this visit on 06/10/24.        ASSESSMENT & Plan    Diagnosis Orders   1. 33 weeks gestation of pregnancy

## 2024-06-24 ENCOUNTER — ROUTINE PRENATAL (OUTPATIENT)
Dept: OBGYN | Age: 29
End: 2024-06-24

## 2024-06-24 VITALS
SYSTOLIC BLOOD PRESSURE: 134 MMHG | WEIGHT: 221.6 LBS | DIASTOLIC BLOOD PRESSURE: 84 MMHG | HEART RATE: 84 BPM | BODY MASS INDEX: 38.04 KG/M2

## 2024-06-24 DIAGNOSIS — N64.59 FLAT NIPPLE: ICD-10-CM

## 2024-06-24 DIAGNOSIS — Z34.03 ENCOUNTER FOR SUPERVISION OF NORMAL FIRST PREGNANCY IN THIRD TRIMESTER: ICD-10-CM

## 2024-06-24 DIAGNOSIS — Z3A.35 35 WEEKS GESTATION OF PREGNANCY: Primary | ICD-10-CM

## 2024-06-24 PROCEDURE — 0502F SUBSEQUENT PRENATAL CARE: CPT | Performed by: ADVANCED PRACTICE MIDWIFE

## 2024-06-24 NOTE — PROGRESS NOTES
Hugo M Jadedanny is here at 35w5d for:    Chief Complaint   Patient presents with    Routine Prenatal Visit     F/U in house growth u/s. Pt states no issues or concerns        Estimated Due Date: Estimated Date of Delivery: 24    OB History    Para Term  AB Living   4 0     3 0   SAB IAB Ectopic Molar Multiple Live Births   3                # Outcome Date GA Lbr Faisal/2nd Weight Sex Delivery Anes PTL Lv   4 Current            3 2023 4w0d    SAB      2 SAB 2022 4w0d    SAB      1 2022 6w0d    SAB           Past Medical History:   Diagnosis Date    Factor V Leiden (HCC)     Infertility, female     Uterine disorder        Past Surgical History:   Procedure Laterality Date    ARM SURGERY Right     times 2, broken arm    HYSTEROSCOPY  2023    hysteroscopic metroplasty       Social History     Tobacco Use   Smoking Status Never   Smokeless Tobacco Never        Social History     Substance and Sexual Activity   Alcohol Use Not Currently               HPI: Patient here today for OB visit with ultrasound for fetal position and growth.  Patient denies any other concerns at this    Yes PT denies fever, chills, nausea and vomiting       Vitals:  Estimated body mass index is 38.04 kg/m² as calculated from the following:    Height as of 3/14/23: 1.626 m (5' 4\").    Weight as of this encounter: 100.5 kg (221 lb 9.6 oz).  BP: 134/84  Weight - Scale: 100.5 kg (221 lb 9.6 oz)  Pulse: 84  Patient Position: Sitting  Albumin: Trace  Glucose: Negative  Fetal HR: 139 us  Movement: Present  Presentation: Vertex           Abdomen: soft    Results reviewed today:     2024  3:21 PM EDT      36.0 WK IUP  EFW:40% (5lb 14oz)  CL:4.7cm   ASHLEY:18.7cm  HR:139bpm  posterior placenta, cephalic presentation  Active fetal movements   Grade 3 placenta            ASSESSMENT & Plan    Diagnosis Orders   1. 35 weeks gestation of pregnancy        2. Encounter for supervision of normal first pregnancy in third trimester

## 2024-07-02 ENCOUNTER — HOSPITAL ENCOUNTER (OUTPATIENT)
Age: 29
Setting detail: SPECIMEN
Discharge: HOME | End: 2024-07-02
Payer: COMMERCIAL

## 2024-07-02 ENCOUNTER — ROUTINE PRENATAL (OUTPATIENT)
Dept: OBGYN | Age: 29
End: 2024-07-02

## 2024-07-02 VITALS
BODY MASS INDEX: 38.45 KG/M2 | WEIGHT: 224 LBS | SYSTOLIC BLOOD PRESSURE: 120 MMHG | DIASTOLIC BLOOD PRESSURE: 80 MMHG | HEART RATE: 106 BPM

## 2024-07-02 DIAGNOSIS — Z88.0 ALLERGY TO PENICILLIN: ICD-10-CM

## 2024-07-02 DIAGNOSIS — Z3A.36 36 WEEKS GESTATION OF PREGNANCY: ICD-10-CM

## 2024-07-02 DIAGNOSIS — Z3A.36 36 WEEKS GESTATION OF PREGNANCY: Primary | ICD-10-CM

## 2024-07-02 PROCEDURE — 87081 CULTURE SCREEN ONLY: CPT

## 2024-07-02 PROCEDURE — 0502F SUBSEQUENT PRENATAL CARE: CPT | Performed by: ADVANCED PRACTICE MIDWIFE

## 2024-07-02 RX ORDER — OMEPRAZOLE 20 MG/1
20 CAPSULE, DELAYED RELEASE ORAL
Qty: 180 CAPSULE | Refills: 0 | Status: SHIPPED | OUTPATIENT
Start: 2024-07-02

## 2024-07-02 NOTE — PROGRESS NOTES
Hugo Monroy is here at 36w6d for:    Chief Complaint   Patient presents with    Routine Prenatal Visit     GBS-SVE, pt states no issues or concerns     Medication Refill     Omeprazole        Estimated Due Date: Estimated Date of Delivery: 24    OB History    Para Term  AB Living   4 0     3 0   SAB IAB Ectopic Molar Multiple Live Births   3                # Outcome Date GA Lbr Faisal/2nd Weight Sex Delivery Anes PTL Lv   4 Current            3 2023 4w0d    SAB      2 SAB 2022 4w0d    SAB      1 2022 6w0d    SAB           Past Medical History:   Diagnosis Date    Factor V Leiden (HCC)     Infertility, female     Uterine disorder        Past Surgical History:   Procedure Laterality Date    ARM SURGERY Right     times 2, broken arm    HYSTEROSCOPY  2023    hysteroscopic metroplasty       Social History     Tobacco Use   Smoking Status Never   Smokeless Tobacco Never        Social History     Substance and Sexual Activity   Alcohol Use Not Currently               HPI: Patient here today.  Patient states medication is significantly helping her heartburn    Yes PT denies fever, chills, nausea and vomiting       Vitals:  Estimated body mass index is 38.45 kg/m² as calculated from the following:    Height as of 3/14/23: 1.626 m (5' 4\").    Weight as of this encounter: 101.6 kg (224 lb).  BP: 120/80  Weight - Scale: 101.6 kg (224 lb)  Pulse: (!) 106  Patient Position: Sitting  Albumin: Trace  Glucose: Negative  Fundal Height (cm): 36 cm  Fetal HR: 154  Movement: Present  Dilation (cm): 1  Effacement: 80  Station: -2  Comments: soft           Abdomen: soft    Results reviewed today:    No results found for this visit on 24.        ASSESSMENT & Plan    Diagnosis Orders   1. 36 weeks gestation of pregnancy  Culture, Strep B Screen, Vaginal/Rectal      2. Allergy to penicillin  Culture, Strep B Screen, Vaginal/Rectal                I am having Hugo Monroy maintain her Prenatal

## 2024-07-05 LAB
MICROORGANISM SPEC CULT: NORMAL
SERVICE CMNT-IMP: NORMAL
SPECIMEN DESCRIPTION: NORMAL

## 2024-07-09 ENCOUNTER — ROUTINE PRENATAL (OUTPATIENT)
Dept: OBGYN | Age: 29
End: 2024-07-09

## 2024-07-09 VITALS
HEART RATE: 91 BPM | DIASTOLIC BLOOD PRESSURE: 80 MMHG | SYSTOLIC BLOOD PRESSURE: 120 MMHG | BODY MASS INDEX: 38.79 KG/M2 | WEIGHT: 226 LBS

## 2024-07-09 DIAGNOSIS — Z34.03 ENCOUNTER FOR SUPERVISION OF NORMAL FIRST PREGNANCY IN THIRD TRIMESTER: ICD-10-CM

## 2024-07-09 DIAGNOSIS — Z3A.37 37 WEEKS GESTATION OF PREGNANCY: Primary | ICD-10-CM

## 2024-07-09 PROCEDURE — 0502F SUBSEQUENT PRENATAL CARE: CPT | Performed by: ADVANCED PRACTICE MIDWIFE

## 2024-07-09 NOTE — PROGRESS NOTES
Hugo Monroy is here at 37w6d for:    Chief Complaint   Patient presents with    Routine Prenatal Visit     SVE, pt would like to discuss being induced. Pt states no issues or concerns        Estimated Due Date: Estimated Date of Delivery: 24    OB History    Para Term  AB Living   4 0     3 0   SAB IAB Ectopic Molar Multiple Live Births   3                # Outcome Date GA Lbr Faisal/2nd Weight Sex Delivery Anes PTL Lv   4 Current            3 SAB 2023 4w0d    SAB      2 SAB 2022 4w0d    SAB      1 2022 6w0d    SAB           Past Medical History:   Diagnosis Date    Factor V Leiden (HCC)     Infertility, female     Uterine disorder        Past Surgical History:   Procedure Laterality Date    ARM SURGERY Right     times 2, broken arm    HYSTEROSCOPY  2023    hysteroscopic metroplasty       Social History     Tobacco Use   Smoking Status Never   Smokeless Tobacco Never        Social History     Substance and Sexual Activity   Alcohol Use Not Currently               HPI: PT here today for OB visit and would like to be induced.     Yes PT denies fever, chills, nausea and vomiting       Vitals:  Estimated body mass index is 38.79 kg/m² as calculated from the following:    Height as of 3/14/23: 1.626 m (5' 4\").    Weight as of this encounter: 102.5 kg (226 lb).  BP: 120/80  Weight - Scale: 102.5 kg (226 lb)  Pulse: 91  Patient Position: Sitting  Albumin: Negative  Glucose: Negative  Fundal Height (cm): 38 cm  Fetal HR: 148  Movement: Present  Dilation (cm): 2  Effacement: 80  Station: -2  Comments: mid soft           Abdomen: soft    Results reviewed today:    No results found for this visit on 24.        ASSESSMENT & Plan    Diagnosis Orders   1. 37 weeks gestation of pregnancy        2. Encounter for supervision of normal first pregnancy in third trimester                  I am having Hugo Monroy maintain her Prenatal MV-Min-Fe Fum-FA-DHA (PRENATAL 1 PO), progesterone,

## 2024-07-14 ENCOUNTER — HOSPITAL ENCOUNTER (OUTPATIENT)
Age: 29
Discharge: HOME | End: 2024-07-14
Attending: OBSTETRICS & GYNECOLOGY | Admitting: OBSTETRICS & GYNECOLOGY
Payer: COMMERCIAL

## 2024-07-14 VITALS
OXYGEN SATURATION: 92 % | TEMPERATURE: 97.5 F | DIASTOLIC BLOOD PRESSURE: 84 MMHG | HEART RATE: 89 BPM | SYSTOLIC BLOOD PRESSURE: 137 MMHG | RESPIRATION RATE: 12 BRPM

## 2024-07-14 PROBLEM — Z3A.38 38 WEEKS GESTATION OF PREGNANCY: Status: ACTIVE | Noted: 2024-07-14

## 2024-07-14 LAB
BACTERIA URNS QL MICRO: ABNORMAL
BILIRUB UR QL STRIP: NEGATIVE
CLARITY UR: ABNORMAL
COLOR UR: YELLOW
EPI CELLS #/AREA URNS HPF: ABNORMAL /HPF (ref 0–25)
GLUCOSE UR STRIP-MCNC: NEGATIVE MG/DL
HGB UR QL STRIP.AUTO: ABNORMAL
KETONES UR STRIP-MCNC: NEGATIVE MG/DL
LEUKOCYTE ESTERASE UR QL STRIP: ABNORMAL
MUCOUS THREADS URNS QL MICRO: ABNORMAL
NITRITE UR QL STRIP: NEGATIVE
PH UR STRIP: 6 [PH] (ref 5–9)
PROT UR STRIP-MCNC: NEGATIVE MG/DL
RBC #/AREA URNS HPF: ABNORMAL /HPF (ref 0–2)
SP GR UR STRIP: >1.03 (ref 1.01–1.02)
UROBILINOGEN UR STRIP-ACNC: NORMAL EU/DL (ref 0–1)
WBC #/AREA URNS HPF: ABNORMAL /HPF (ref 0–5)

## 2024-07-14 PROCEDURE — 96365 THER/PROPH/DIAG IV INF INIT: CPT

## 2024-07-14 PROCEDURE — 2580000003 HC RX 258: Performed by: OBSTETRICS & GYNECOLOGY

## 2024-07-14 PROCEDURE — 96375 TX/PRO/DX INJ NEW DRUG ADDON: CPT

## 2024-07-14 PROCEDURE — 87086 URINE CULTURE/COLONY COUNT: CPT

## 2024-07-14 PROCEDURE — 6370000000 HC RX 637 (ALT 250 FOR IP): Performed by: OBSTETRICS & GYNECOLOGY

## 2024-07-14 PROCEDURE — 6360000002 HC RX W HCPCS: Performed by: OBSTETRICS & GYNECOLOGY

## 2024-07-14 PROCEDURE — 81001 URINALYSIS AUTO W/SCOPE: CPT

## 2024-07-14 PROCEDURE — 59025 FETAL NON-STRESS TEST: CPT

## 2024-07-14 PROCEDURE — 6360000002 HC RX W HCPCS

## 2024-07-14 RX ORDER — NALBUPHINE HYDROCHLORIDE 10 MG/ML
10 INJECTION, SOLUTION INTRAMUSCULAR; INTRAVENOUS; SUBCUTANEOUS ONCE
Status: COMPLETED | OUTPATIENT
Start: 2024-07-14 | End: 2024-07-14

## 2024-07-14 RX ORDER — NALBUPHINE HYDROCHLORIDE 10 MG/ML
INJECTION, SOLUTION INTRAMUSCULAR; INTRAVENOUS; SUBCUTANEOUS
Status: COMPLETED
Start: 2024-07-14 | End: 2024-07-14

## 2024-07-14 RX ORDER — NITROFURANTOIN 25; 75 MG/1; MG/1
100 CAPSULE ORAL 2 TIMES DAILY
Status: DISCONTINUED | OUTPATIENT
Start: 2024-07-14 | End: 2024-07-14

## 2024-07-14 RX ORDER — CLINDAMYCIN PHOSPHATE 900 MG/50ML
900 INJECTION, SOLUTION INTRAVENOUS ONCE
Status: COMPLETED | OUTPATIENT
Start: 2024-07-14 | End: 2024-07-14

## 2024-07-14 RX ORDER — NITROFURANTOIN 25; 75 MG/1; MG/1
100 CAPSULE ORAL EVERY 12 HOURS SCHEDULED
Status: DISCONTINUED | OUTPATIENT
Start: 2024-07-14 | End: 2024-07-14

## 2024-07-14 RX ORDER — SODIUM CHLORIDE, SODIUM LACTATE, POTASSIUM CHLORIDE, AND CALCIUM CHLORIDE .6; .31; .03; .02 G/100ML; G/100ML; G/100ML; G/100ML
1000 INJECTION, SOLUTION INTRAVENOUS ONCE
Status: COMPLETED | OUTPATIENT
Start: 2024-07-14 | End: 2024-07-14

## 2024-07-14 RX ORDER — HYDROXYZINE PAMOATE 50 MG/1
50 CAPSULE ORAL ONCE
Status: COMPLETED | OUTPATIENT
Start: 2024-07-14 | End: 2024-07-14

## 2024-07-14 RX ADMIN — HYDROXYZINE PAMOATE 50 MG: 50 CAPSULE ORAL at 12:58

## 2024-07-14 RX ADMIN — SODIUM CHLORIDE, POTASSIUM CHLORIDE, SODIUM LACTATE AND CALCIUM CHLORIDE 1000 ML: 600; 310; 30; 20 INJECTION, SOLUTION INTRAVENOUS at 12:57

## 2024-07-14 RX ADMIN — NALBUPHINE HYDROCHLORIDE 10 MG: 10 INJECTION, SOLUTION INTRAMUSCULAR; INTRAVENOUS; SUBCUTANEOUS at 15:53

## 2024-07-14 RX ADMIN — CLINDAMYCIN PHOSPHATE 900 MG: 900 INJECTION, SOLUTION INTRAVENOUS at 12:57

## 2024-07-14 RX ADMIN — Medication 10 MG: at 15:53

## 2024-07-14 ASSESSMENT — PAIN SCALES - GENERAL: PAINLEVEL_OUTOF10: 7

## 2024-07-14 ASSESSMENT — PAIN DESCRIPTION - DESCRIPTORS: DESCRIPTORS: CRAMPING;DISCOMFORT

## 2024-07-14 ASSESSMENT — PAIN DESCRIPTION - LOCATION: LOCATION: ABDOMEN

## 2024-07-14 NOTE — FLOWSHEET NOTE
Updated dr lombardo on cervical exam remaining the same. Patient still having 6-8 pain with contractions despite them spacing out. Orders for 10 mg nubain IV push.

## 2024-07-14 NOTE — FLOWSHEET NOTE
AVS discharge instruction reviewed and given to patient. Patient voiced understanding regarding follow up appointments, and care of self at home.  Discharged home in stable condition with partner. When to return reviewed as outlined in the AVS. Vinicio almanzar literature provided.

## 2024-07-14 NOTE — PROGRESS NOTES
Patient arrives to room 205.  Here with complaints of contractions starting this morning. Around 10 minutes apart. States she had her membranes swept at her last appointment and had some vaginal bleeding that day.      Denies ROM or vaginal bleeding today.  Denies N/V/D.  Denies fever/chills.      Assisted into bed, call light within reach.    Relates of positive fetal movement.  EFM applied and monitor test completed/passed.    Request for urine sample made and instructed on clean catch urine.     Consent obtained.        ** patient notices pink on toilet paper after returning from restroom.

## 2024-07-14 NOTE — FLOWSHEET NOTE
Hugo Monroy is here in L&D at 38w4d for:rule out uterine contractions      Maternal Vitals  Temp: 97.5 °F (36.4 °C)  Temp Source: Oral  BP: 138/83  Pulse: 89  Respirations: 18    Testing  Reason for NST: Other (Comment) (contractions)  Explained test to patient: Yes    Findings  Baseline: 145 BPM  Baseline Classification: Normal  Variability: Moderate  Decelerations: Variable (small occasional, reviewed with dr. roth)  Accelerations: Yes  Acoustic Stimulator: No  Fetal Movement: Yes  Multiple Gestation?: No  Uterine contractions/10 min.: 4  Interpretation: Reactive  Interpreted by: ash zhang  $Non Stress Test Charge : Yes    Movement verbalized         NST Time start:  1140    NST Time stop:   1200       NST is reactive. Quality of tracing is satisfactory.      Entire EFM strip reviewed with Dr. Roth in person at 1415 while he was in unit.

## 2024-07-14 NOTE — DISCHARGE INSTRUCTIONS
Call your physicians office or notify your physician by paging them if you experience:    Signs of Pre-eclampsia:  Dizziness or severe headache  Spots before eyes or blurred vision  Epigastric pain / Right side abdominal pain  Swelling of face, hands, legs or feet not decreased with rest on left side.    Signs of infection:  Chills  Fever  Start of, or change in, vaginal discharge    Signs of labor:  Uterine contractions are regular and 5 minutes apart if 1st baby or 10 minutes apart if not 1st baby  Bag or yanes leaking or gush of fluid from vagina  Any vaginal bleeding that is heavier than a menstrual period  Intermittent low backache  Abdominal or menstrual like cramping that is constant or heavier, comes and goes  Vaginal pressure    Potential for dehydration:  Vomiting or diarrhea for several hours    Potential abnormal fetal well being signs:  Decrease or absence of baby movement

## 2024-07-14 NOTE — FLOWSHEET NOTE
Updated dr. Roth on cervical exam remaining the same and that medications are started. We will assess pain after bolus and IV antibiotics are done. If contractions remain intense, we will reassess cervix. If contractions slow down of become less painful, we will consider d/c.

## 2024-07-14 NOTE — FLOWSHEET NOTE
Spoke with dr. Roth about patients arrival and urinalysis. Orders taken including     Clindamycin IV once  50 Oral vistaril  1 liter IV fluid bolus.     Will recheck cervix at 2 hour sandeep      Oral hydration encouraged

## 2024-07-14 NOTE — FLOWSHEET NOTE
Reviewed efm tracings unchanged cervical exam and pain level with dr. Roth. D/c order obtained. Patient agreeable

## 2024-07-15 ENCOUNTER — ANESTHESIA EVENT (OUTPATIENT)
Dept: LABOR AND DELIVERY | Age: 29
End: 2024-07-15
Payer: COMMERCIAL

## 2024-07-15 ENCOUNTER — ANESTHESIA (OUTPATIENT)
Dept: LABOR AND DELIVERY | Age: 29
End: 2024-07-15
Payer: COMMERCIAL

## 2024-07-15 ENCOUNTER — HOSPITAL ENCOUNTER (INPATIENT)
Age: 29
LOS: 2 days | Discharge: HOME | End: 2024-07-17
Attending: OBSTETRICS & GYNECOLOGY | Admitting: ADVANCED PRACTICE MIDWIFE
Payer: COMMERCIAL

## 2024-07-15 PROBLEM — Z34.93 THIRD TRIMESTER PREGNANCY: Status: ACTIVE | Noted: 2024-07-15

## 2024-07-15 LAB
ABO + RH BLD: NORMAL
ARM BAND NUMBER: NORMAL
BASOPHILS # BLD: 0.03 K/UL (ref 0–0.2)
BASOPHILS NFR BLD: 0 % (ref 0–2)
BLOOD BANK SAMPLE EXPIRATION: NORMAL
BLOOD GROUP ANTIBODIES SERPL: NEGATIVE
EOSINOPHIL # BLD: <0.03 K/UL (ref 0–0.44)
EOSINOPHILS RELATIVE PERCENT: 0 % (ref 1–4)
ERYTHROCYTE [DISTWIDTH] IN BLOOD BY AUTOMATED COUNT: 12.1 % (ref 11.8–14.4)
HCT VFR BLD AUTO: 25.8 % (ref 36.3–47.1)
HCT VFR BLD AUTO: 35.9 % (ref 36.3–47.1)
HGB BLD-MCNC: 12 G/DL (ref 11.9–15.1)
HGB BLD-MCNC: 8.9 G/DL (ref 11.9–15.1)
IMM GRANULOCYTES # BLD AUTO: 0.08 K/UL (ref 0–0.3)
IMM GRANULOCYTES NFR BLD: 1 %
LYMPHOCYTES NFR BLD: 0.95 K/UL (ref 1.1–3.7)
LYMPHOCYTES RELATIVE PERCENT: 6 % (ref 24–43)
MCH RBC QN AUTO: 30 PG (ref 25.2–33.5)
MCHC RBC AUTO-ENTMCNC: 33.4 G/DL (ref 28.4–34.8)
MCV RBC AUTO: 89.8 FL (ref 82.6–102.9)
MICROORGANISM SPEC CULT: NORMAL
MONOCYTES NFR BLD: 0.72 K/UL (ref 0.1–1.2)
MONOCYTES NFR BLD: 4 % (ref 3–12)
NEUTROPHILS NFR BLD: 89 % (ref 36–65)
NEUTS SEG NFR BLD: 14.7 K/UL (ref 1.5–8.1)
NRBC BLD-RTO: 0 PER 100 WBC
PLATELET # BLD AUTO: ABNORMAL K/UL (ref 138–453)
PLATELET, FLUORESCENCE: 160 K/UL (ref 138–453)
PLATELETS.RETICULATED NFR BLD AUTO: 14 % (ref 1.1–10.3)
RBC # BLD AUTO: 4 M/UL (ref 3.95–5.11)
SPECIMEN DESCRIPTION: NORMAL
WBC OTHER # BLD: 16.5 K/UL (ref 3.5–11.3)

## 2024-07-15 PROCEDURE — 0KQM0ZZ REPAIR PERINEUM MUSCLE, OPEN APPROACH: ICD-10-PCS | Performed by: ADVANCED PRACTICE MIDWIFE

## 2024-07-15 PROCEDURE — 6360000002 HC RX W HCPCS: Performed by: ADVANCED PRACTICE MIDWIFE

## 2024-07-15 PROCEDURE — 6360000002 HC RX W HCPCS: Performed by: NURSE ANESTHETIST, CERTIFIED REGISTERED

## 2024-07-15 PROCEDURE — 86850 RBC ANTIBODY SCREEN: CPT

## 2024-07-15 PROCEDURE — 2580000003 HC RX 258: Performed by: ADVANCED PRACTICE MIDWIFE

## 2024-07-15 PROCEDURE — 3700000025 EPIDURAL BLOCK: Performed by: NURSE ANESTHETIST, CERTIFIED REGISTERED

## 2024-07-15 PROCEDURE — 1220000000 HC SEMI PRIVATE OB R&B

## 2024-07-15 PROCEDURE — 51702 INSERT TEMP BLADDER CATH: CPT

## 2024-07-15 PROCEDURE — 6370000000 HC RX 637 (ALT 250 FOR IP): Performed by: ADVANCED PRACTICE MIDWIFE

## 2024-07-15 PROCEDURE — 85025 COMPLETE CBC W/AUTO DIFF WBC: CPT

## 2024-07-15 PROCEDURE — 59400 OBSTETRICAL CARE: CPT | Performed by: ADVANCED PRACTICE MIDWIFE

## 2024-07-15 PROCEDURE — 85018 HEMOGLOBIN: CPT

## 2024-07-15 PROCEDURE — 85014 HEMATOCRIT: CPT

## 2024-07-15 PROCEDURE — 86901 BLOOD TYPING SEROLOGIC RH(D): CPT

## 2024-07-15 PROCEDURE — 7200000001 HC VAGINAL DELIVERY

## 2024-07-15 PROCEDURE — 86900 BLOOD TYPING SEROLOGIC ABO: CPT

## 2024-07-15 PROCEDURE — 2500000003 HC RX 250 WO HCPCS: Performed by: ADVANCED PRACTICE MIDWIFE

## 2024-07-15 RX ORDER — NALOXONE HYDROCHLORIDE 0.4 MG/ML
INJECTION, SOLUTION INTRAMUSCULAR; INTRAVENOUS; SUBCUTANEOUS PRN
Status: DISCONTINUED | OUTPATIENT
Start: 2024-07-15 | End: 2024-07-15

## 2024-07-15 RX ORDER — NALBUPHINE HYDROCHLORIDE 10 MG/ML
10 INJECTION, SOLUTION INTRAMUSCULAR; INTRAVENOUS; SUBCUTANEOUS
Status: DISCONTINUED | OUTPATIENT
Start: 2024-07-15 | End: 2024-07-15

## 2024-07-15 RX ORDER — LIDOCAINE HYDROCHLORIDE 10 MG/ML
30 INJECTION, SOLUTION EPIDURAL; INFILTRATION; INTRACAUDAL; PERINEURAL PRN
Status: DISCONTINUED | OUTPATIENT
Start: 2024-07-15 | End: 2024-07-15

## 2024-07-15 RX ORDER — SODIUM CHLORIDE, SODIUM LACTATE, POTASSIUM CHLORIDE, AND CALCIUM CHLORIDE .6; .31; .03; .02 G/100ML; G/100ML; G/100ML; G/100ML
1000 INJECTION, SOLUTION INTRAVENOUS PRN
Status: DISCONTINUED | OUTPATIENT
Start: 2024-07-15 | End: 2024-07-15

## 2024-07-15 RX ORDER — ROPIVACAINE HYDROCHLORIDE 2 MG/ML
10 INJECTION, SOLUTION EPIDURAL; INFILTRATION; PERINEURAL CONTINUOUS
Status: DISCONTINUED | OUTPATIENT
Start: 2024-07-15 | End: 2024-07-15

## 2024-07-15 RX ORDER — SODIUM CHLORIDE, SODIUM LACTATE, POTASSIUM CHLORIDE, CALCIUM CHLORIDE 600; 310; 30; 20 MG/100ML; MG/100ML; MG/100ML; MG/100ML
INJECTION, SOLUTION INTRAVENOUS CONTINUOUS
Status: DISCONTINUED | OUTPATIENT
Start: 2024-07-15 | End: 2024-07-15

## 2024-07-15 RX ORDER — SODIUM CHLORIDE 0.9 % (FLUSH) 0.9 %
5-40 SYRINGE (ML) INJECTION PRN
Status: DISCONTINUED | OUTPATIENT
Start: 2024-07-15 | End: 2024-07-15

## 2024-07-15 RX ORDER — DOCUSATE SODIUM 100 MG/1
100 CAPSULE, LIQUID FILLED ORAL 2 TIMES DAILY PRN
Status: DISCONTINUED | OUTPATIENT
Start: 2024-07-15 | End: 2024-07-17 | Stop reason: HOSPADM

## 2024-07-15 RX ORDER — ACETAMINOPHEN 325 MG/1
650 TABLET ORAL EVERY 4 HOURS PRN
Status: DISCONTINUED | OUTPATIENT
Start: 2024-07-15 | End: 2024-07-15

## 2024-07-15 RX ORDER — SODIUM CHLORIDE, SODIUM LACTATE, POTASSIUM CHLORIDE, AND CALCIUM CHLORIDE .6; .31; .03; .02 G/100ML; G/100ML; G/100ML; G/100ML
500 INJECTION, SOLUTION INTRAVENOUS PRN
Status: DISCONTINUED | OUTPATIENT
Start: 2024-07-15 | End: 2024-07-15

## 2024-07-15 RX ORDER — CARBOPROST TROMETHAMINE 250 UG/ML
250 INJECTION, SOLUTION INTRAMUSCULAR PRN
Status: DISCONTINUED | OUTPATIENT
Start: 2024-07-15 | End: 2024-07-17 | Stop reason: HOSPADM

## 2024-07-15 RX ORDER — ONDANSETRON 4 MG/1
4 TABLET, ORALLY DISINTEGRATING ORAL EVERY 6 HOURS PRN
Status: DISCONTINUED | OUTPATIENT
Start: 2024-07-15 | End: 2024-07-17 | Stop reason: HOSPADM

## 2024-07-15 RX ORDER — ONDANSETRON 2 MG/ML
4 INJECTION INTRAMUSCULAR; INTRAVENOUS EVERY 6 HOURS PRN
Status: DISCONTINUED | OUTPATIENT
Start: 2024-07-15 | End: 2024-07-17 | Stop reason: HOSPADM

## 2024-07-15 RX ORDER — SODIUM CHLORIDE 0.9 % (FLUSH) 0.9 %
5-40 SYRINGE (ML) INJECTION EVERY 12 HOURS SCHEDULED
Status: DISCONTINUED | OUTPATIENT
Start: 2024-07-15 | End: 2024-07-17 | Stop reason: HOSPADM

## 2024-07-15 RX ORDER — METHYLERGONOVINE MALEATE 0.2 MG/ML
200 INJECTION INTRAVENOUS PRN
Status: DISCONTINUED | OUTPATIENT
Start: 2024-07-15 | End: 2024-07-15

## 2024-07-15 RX ORDER — SODIUM CHLORIDE 9 MG/ML
INJECTION, SOLUTION INTRAVENOUS PRN
Status: DISCONTINUED | OUTPATIENT
Start: 2024-07-15 | End: 2024-07-15

## 2024-07-15 RX ORDER — EPHEDRINE SULFATE/0.9% NACL/PF 25 MG/5 ML
5 SYRINGE (ML) INTRAVENOUS PRN
Status: DISCONTINUED | OUTPATIENT
Start: 2024-07-15 | End: 2024-07-15

## 2024-07-15 RX ORDER — ACETAMINOPHEN 500 MG
1000 TABLET ORAL ONCE
Status: COMPLETED | OUTPATIENT
Start: 2024-07-15 | End: 2024-07-15

## 2024-07-15 RX ORDER — MISOPROSTOL 100 UG/1
800 TABLET ORAL PRN
Status: DISCONTINUED | OUTPATIENT
Start: 2024-07-15 | End: 2024-07-17 | Stop reason: HOSPADM

## 2024-07-15 RX ORDER — SODIUM CHLORIDE 0.9 % (FLUSH) 0.9 %
5-40 SYRINGE (ML) INJECTION EVERY 12 HOURS SCHEDULED
Status: DISCONTINUED | OUTPATIENT
Start: 2024-07-15 | End: 2024-07-15

## 2024-07-15 RX ORDER — ACETAMINOPHEN 500 MG
1000 TABLET ORAL EVERY 8 HOURS PRN
Status: DISCONTINUED | OUTPATIENT
Start: 2024-07-15 | End: 2024-07-17 | Stop reason: HOSPADM

## 2024-07-15 RX ORDER — ONDANSETRON 4 MG/1
4 TABLET, ORALLY DISINTEGRATING ORAL EVERY 6 HOURS PRN
Status: DISCONTINUED | OUTPATIENT
Start: 2024-07-15 | End: 2024-07-15

## 2024-07-15 RX ORDER — MODIFIED LANOLIN
OINTMENT (GRAM) TOPICAL PRN
Status: DISCONTINUED | OUTPATIENT
Start: 2024-07-15 | End: 2024-07-17 | Stop reason: HOSPADM

## 2024-07-15 RX ORDER — TERBUTALINE SULFATE 1 MG/ML
0.25 INJECTION, SOLUTION SUBCUTANEOUS
Status: DISCONTINUED | OUTPATIENT
Start: 2024-07-15 | End: 2024-07-15

## 2024-07-15 RX ORDER — ONDANSETRON 2 MG/ML
4 INJECTION INTRAMUSCULAR; INTRAVENOUS EVERY 6 HOURS PRN
Status: DISCONTINUED | OUTPATIENT
Start: 2024-07-15 | End: 2024-07-15

## 2024-07-15 RX ORDER — SEVOFLURANE 250 ML/250ML
1 LIQUID RESPIRATORY (INHALATION) CONTINUOUS PRN
Status: DISCONTINUED | OUTPATIENT
Start: 2024-07-15 | End: 2024-07-15

## 2024-07-15 RX ORDER — CARBOPROST TROMETHAMINE 250 UG/ML
250 INJECTION, SOLUTION INTRAMUSCULAR PRN
Status: DISCONTINUED | OUTPATIENT
Start: 2024-07-15 | End: 2024-07-15

## 2024-07-15 RX ORDER — IBUPROFEN 800 MG/1
800 TABLET ORAL EVERY 8 HOURS SCHEDULED
Status: DISCONTINUED | OUTPATIENT
Start: 2024-07-15 | End: 2024-07-17 | Stop reason: HOSPADM

## 2024-07-15 RX ORDER — METHYLERGONOVINE MALEATE 0.2 MG/ML
200 INJECTION INTRAVENOUS PRN
Status: DISCONTINUED | OUTPATIENT
Start: 2024-07-15 | End: 2024-07-17 | Stop reason: HOSPADM

## 2024-07-15 RX ORDER — ROPIVACAINE HYDROCHLORIDE 2 MG/ML
INJECTION, SOLUTION EPIDURAL; INFILTRATION; PERINEURAL PRN
Status: DISCONTINUED | OUTPATIENT
Start: 2024-07-15 | End: 2024-07-16 | Stop reason: SDUPTHER

## 2024-07-15 RX ADMIN — ROPIVACAINE HYDROCHLORIDE 10 ML/HR: 2 INJECTION, SOLUTION EPIDURAL; INFILTRATION at 06:02

## 2024-07-15 RX ADMIN — SODIUM CHLORIDE, PRESERVATIVE FREE 10 ML: 5 INJECTION INTRAVENOUS at 16:03

## 2024-07-15 RX ADMIN — ACETAMINOPHEN 1000 MG: 500 TABLET ORAL at 11:50

## 2024-07-15 RX ADMIN — Medication: at 15:49

## 2024-07-15 RX ADMIN — METHYLERGONOVINE MALEATE 200 MCG: 0.2 INJECTION INTRAVENOUS at 12:49

## 2024-07-15 RX ADMIN — SODIUM CHLORIDE, POTASSIUM CHLORIDE, SODIUM LACTATE AND CALCIUM CHLORIDE: 600; 310; 30; 20 INJECTION, SOLUTION INTRAVENOUS at 11:41

## 2024-07-15 RX ADMIN — Medication: at 15:47

## 2024-07-15 RX ADMIN — ROPIVACAINE HYDROCHLORIDE 10 ML/HR: 2 INJECTION, SOLUTION EPIDURAL; INFILTRATION at 12:00

## 2024-07-15 RX ADMIN — ACETAMINOPHEN 1000 MG: 500 TABLET ORAL at 19:50

## 2024-07-15 RX ADMIN — Medication 999 ML/HR: at 12:48

## 2024-07-15 RX ADMIN — IBUPROFEN 800 MG: 800 TABLET, FILM COATED ORAL at 14:56

## 2024-07-15 RX ADMIN — ROPIVACAINE HYDROCHLORIDE 8 ML: 2 INJECTION, SOLUTION EPIDURAL; INFILTRATION at 06:01

## 2024-07-15 RX ADMIN — TRANEXAMIC ACID 1000 MG: 100 INJECTION, SOLUTION INTRAVENOUS at 12:52

## 2024-07-15 ASSESSMENT — PAIN DESCRIPTION - ORIENTATION
ORIENTATION: LOWER
ORIENTATION: LOWER

## 2024-07-15 ASSESSMENT — PAIN SCALES - GENERAL
PAINLEVEL_OUTOF10: 4
PAINLEVEL_OUTOF10: 2
PAINLEVEL_OUTOF10: 6
PAINLEVEL_OUTOF10: 3

## 2024-07-15 ASSESSMENT — PAIN DESCRIPTION - LOCATION
LOCATION: PERINEUM
LOCATION: PERINEUM

## 2024-07-15 ASSESSMENT — PAIN DESCRIPTION - DESCRIPTORS: DESCRIPTORS: SORE

## 2024-07-15 NOTE — FLOWSHEET NOTE
Sara KNOX notified of Hgb of 8.9, patient up to bathroom, tolerated well,  pulse increase to 110 to 120 range, orders received

## 2024-07-15 NOTE — FLOWSHEET NOTE
Patient arrives to unit at this time c/o contractions. Patient in unit earlier today for r/o labor as outpatient, was d/c after no cervical change made. New cervical exam shows cervical change, dilation 5 cm with consistent ctx. pattern.

## 2024-07-15 NOTE — ANESTHESIA PROCEDURE NOTES
Epidural Block    Patient location during procedure: patient floor  Start time: 7/15/2024 5:52 AM  End time: 7/15/2024 6:02 AM  Reason for block: labor epidural  Staffing  Resident/CRNA: Eduin Melara APRN - CRNA  Performed by: Eduin Melara APRN - CRNA  Authorized by: Eduin Melara APRN - CRNA    Epidural  Patient position: sitting  Prep: ChloraPrep  Patient monitoring: cardiac monitor, continuous pulse ox and frequent blood pressure checks  Approach: midline  Location: L2-3  Injection technique: MIKE air  Provider prep: mask and sterile gloves  Needle  Needle type: Tuohy   Needle gauge: 17 G  Needle length: 3.5 in  Needle insertion depth: 5.5 cm  Catheter type: side hole  Catheter size: 19 G  Catheter at skin depth: 12 cm  Test dose: negativeCatheter Secured: tegaderm and tape  Assessment  Sensory level: T10  Hemodynamics: stable  Attempts: 1  Outcomes: uncomplicated and patient tolerated procedure well  Preanesthetic Checklist  Completed: patient identified, IV checked, site marked, risks and benefits discussed, surgical/procedural consents, equipment checked, pre-op evaluation, timeout performed, anesthesia consent given, oxygen available and monitors applied/VS acknowledged

## 2024-07-15 NOTE — LACTATION NOTE
LC consult for  infant. Mother and father with no questions or concerns at this time. Infant and mother resting. No feeding observed. LC provided information on lactation services.    Lactation education resources given:     [x]  How to Breastfeed your baby - [facility] publication      [x]  Follow up support information    [x]  Breast milk storage guidelines - [facility]    [x]  Breastpump cleaning guidelines - CDC     [x]  Breastfeeding & Safe Sleep handout - [facility] publication    []  Calling All Dads! Handout - [facility] publication    Signed:  Mey Pendleton RN, BSN, IBCLC

## 2024-07-15 NOTE — FLOWSHEET NOTE
Sara Horne CNM notified of temperature of 101, UTI treated with antibiotic 1 dose when a outpatient 7/14/24 per patient.  FHR baseline 170 range with variables now noted,  SVE complete +1 to +2, informed finishing up and will be down to OB department

## 2024-07-15 NOTE — FLOWSHEET NOTE
Sara Horne CNM into OB department, informed of baseline 160 range up to 170 at times,  occasional late decelerations,  last SVE results, BBOW with contractions, contraction pattern

## 2024-07-15 NOTE — FLOWSHEET NOTE
Talked with Dr Najjar informed of pulse ox remaining between 93 to 98 in room air,  infant spit up thick mucus and pulse ox has been remaining around 98,  no flaring, grunting or retractions,  orders received

## 2024-07-15 NOTE — L&D DELIVERY NOTE
Porfirio Filippo Arias [[id number]]      Labor Events     Labor: No  Cervical Ripening Date/Time:        Rupture Date/Time:  7/15/24 08:02:00   Rupture Type: AROM, Intact  Fluid Color: Pink, Bloody Show, Other (Comment)  Fluid Odor: None  Augmentation: AROM              Anesthesia    Method: Epidural       Labor Event Times      Labor onset date/time:        Dilation complete date/time:  7/15/24 11:30:00 EDT     Start pushing date/time:  7/15/2024 12:21:00   Decision date/time (emergent ):            Delivery Details      Delivery Date: 7/15/24 Delivery Time: 12:44:00   Delivery Type: Vaginal, Spontaneous               Presentation    Presentation: Vertex  Position: Middle  _: Occiput  _: Anterior       Shoulder Dystocia    Shoulder Dystocia Present?: No       Assisted Delivery Details    Forceps Attempted?: No  Vacuum Extractor Attempted?: No                           Cord    Vessels: 3 Vessels  Complications: Nuchal Loose  Delayed Cord Clamping?: Yes  Cord Blood Disposition: Lab  Gases Sent?: No   Cord Comments:  PROCEDURAL - OBSTETRIC - CORD OBSERVATION   cord segment obtained and sent to lab             Placenta    Removal: Spontaneous  Appearance: Intact  Disposition: Lab       Lacerations    Episiotomy: None  Perineal Lacerations: 2nd  Other Lacerations: sulcus laceration  Sulcus Laceration: bilateral Repaired?: Yes   Number of Repair Packets: 3       Blood Loss  Mother: JadeHugo delgado #[id number]     Start of Mother's Information      Delivery Blood Loss  07/15/24 0044 - 07/15/24 1501      Quantitative Blood Loss (mL) Hospital Encounter 1258 grams    Total  1258 mL               End of Mother's Information  Mother: Hugo Monroy #[id number]                Delivery Providers    Delivering clinician: Sonia Horne APRN - CNM     Provider Role     Obstetrician     Primary Nurse     Primary [place] Nurse     NICU Nurse     Neonatologist     Anesthesiologist     Nurse Anesthetist     Nurse  explored and second degree and bilateral sulcus lacerations were noted.    After delivery of the baby - active bleeding was noted.   Cord ws clamped  IV pitocin open  Called for methergine and TXA  Bleeding was noted from bilateral sulcus lacerations.   Repair was completed with hemostasis noted      PPH was noted and H&H was ordered for 4 hours

## 2024-07-15 NOTE — FLOWSHEET NOTE
HANNAH Horne CNM called at this time given update about patient arrival and most recent SVE. New labor orders received.

## 2024-07-16 LAB
HCT VFR BLD AUTO: 23 % (ref 36.3–47.1)
HGB BLD-MCNC: 7.7 G/DL (ref 11.9–15.1)

## 2024-07-16 PROCEDURE — 36415 COLL VENOUS BLD VENIPUNCTURE: CPT

## 2024-07-16 PROCEDURE — 1220000000 HC SEMI PRIVATE OB R&B

## 2024-07-16 PROCEDURE — 6370000000 HC RX 637 (ALT 250 FOR IP): Performed by: ADVANCED PRACTICE MIDWIFE

## 2024-07-16 PROCEDURE — 85018 HEMOGLOBIN: CPT

## 2024-07-16 PROCEDURE — 85014 HEMATOCRIT: CPT

## 2024-07-16 PROCEDURE — 2580000003 HC RX 258: Performed by: ADVANCED PRACTICE MIDWIFE

## 2024-07-16 RX ADMIN — IBUPROFEN 800 MG: 800 TABLET, FILM COATED ORAL at 01:09

## 2024-07-16 RX ADMIN — IBUPROFEN 800 MG: 800 TABLET, FILM COATED ORAL at 08:01

## 2024-07-16 RX ADMIN — ACETAMINOPHEN 1000 MG: 500 TABLET ORAL at 04:06

## 2024-07-16 RX ADMIN — ACETAMINOPHEN 1000 MG: 500 TABLET ORAL at 12:48

## 2024-07-16 RX ADMIN — SODIUM CHLORIDE, PRESERVATIVE FREE 10 ML: 5 INJECTION INTRAVENOUS at 08:02

## 2024-07-16 RX ADMIN — IBUPROFEN 800 MG: 800 TABLET, FILM COATED ORAL at 17:51

## 2024-07-16 ASSESSMENT — PAIN DESCRIPTION - LOCATION
LOCATION: HEAD
LOCATION: PERINEUM

## 2024-07-16 ASSESSMENT — PAIN SCALES - GENERAL
PAINLEVEL_OUTOF10: 2
PAINLEVEL_OUTOF10: 3
PAINLEVEL_OUTOF10: 0
PAINLEVEL_OUTOF10: 4

## 2024-07-16 ASSESSMENT — PAIN DESCRIPTION - DESCRIPTORS
DESCRIPTORS: SORE
DESCRIPTORS: ACHING

## 2024-07-16 NOTE — PROGRESS NOTES
POST PARTUM DAY # 1    Hugo Monroy is a 28 y.o. female  This patient was seen & examined today.    Her pregnancy was complicated by:   Patient Active Problem List   Diagnosis    Polyp of corpus uteri    Factor V Leiden (HCC)    Uterine septum    38 weeks gestation of pregnancy    Third trimester pregnancy     (normal spontaneous vaginal delivery)    Third-stage postpartum hemorrhage       Today she is doing well without any chief complaint. Her lochia is light. She denies chest pain, shortness of breath, headache, lightheadedness and blurred vision. She is breast feeding and she denies any breast tenderness. She is ambulating well. Her voiding pattern is normal. I reviewed signs and symptoms of post partum depression with the patient, she currently denies any of these symptoms. She is tolerating solids.     Vital Signs:  Vitals:    07/15/24 1535 07/15/24 1953 24 0406 24 0739   BP: 134/63 130/64 126/60 (!) 112/55   Pulse: (!) 120 90 (!) 102 76   Resp: 18   17   Temp: 99.2 °F (37.3 °C) 97.8 °F (36.6 °C) 97.9 °F (36.6 °C) 97.8 °F (36.6 °C)   TempSrc: Oral Oral Oral Oral   SpO2:  97%  98%         Physical Exam:  General:  no apparent distress, alert and cooperative  Neurologic:  alert, oriented, normal speech, no focal findings or movement disorder noted  Lungs:  No increased work of breathing, good air exchange  Heart:  Regular rate  Abdomen: abdomen soft, non-distended, non-tender  Fundus: non-tender, firm, below umbilicus  Extremities:  no calf tenderness, non edematous    Lab:  Lab Results   Component Value Date    HGB 7.7 (L) 2024     Lab Results   Component Value Date    HCT 23.0 (L) 2024       Assessment/Plan:  Hugo Monroy is a  PPD # 1 s/p    - Doing well, VSS   - male infant in General Care Nursery, circumcision desired   - Encourage ambulation   - Postpartum Hgb/Hct if sx   - Motrin/Tylenol for pain control    Rh positive/Rubella immune   - Rhogam and MMR not

## 2024-07-16 NOTE — LACTATION NOTE
Lactation note:    [] Feeding observed, see lactation flowsheet.    [] Patient states breastfeeding is going well:  no complaints.  Denies questions or concerns.    [x] Patient has questions/concerns.  Has had issues latching. Does not desire LC assistance with latch. States that she is more interested in exclusive pumping. Discussed pros and cons of exclusive pumping and her pumps that she has to use. We sized her for size 15 mm flanges and pt is able to express 6 ml in 10 minutes with hand pump. She has flange inserts at home that she can use.    [x] Verbalizes understanding, advised to follow up with lactation consultant if necessary.

## 2024-07-16 NOTE — ANESTHESIA POSTPROCEDURE EVALUATION
Department of Anesthesiology  Postprocedure Note    Patient: Hugo Monroy  MRN: [id number]  YOB: 1995  Date of evaluation: 7/16/2024    Procedure Summary       Date: 07/15/24 Room / Location:     Anesthesia Start: 0552 Anesthesia Stop: 1244    Procedure: Labor Analgesia Diagnosis:     Scheduled Providers:  Responsible Provider: Eduin Melara APRN - CRNA    Anesthesia Type: epidural ASA Status: 2            Anesthesia Type: No value filed.    Felicia Phase I: Felicia Score: 10    Felicia Phase II:      Anesthesia Post Evaluation    Patient location during evaluation: floor  Patient participation: complete - patient participated  Level of consciousness: awake  Airway patency: patent  Nausea & Vomiting: no nausea and no vomiting  Cardiovascular status: blood pressure returned to baseline  Respiratory status: acceptable  Hydration status: euvolemic  Comments: Pt denies headache, sensory and motor function returned to baseline  Pain management: adequate    No notable events documented.

## 2024-07-17 VITALS
TEMPERATURE: 98 F | DIASTOLIC BLOOD PRESSURE: 60 MMHG | SYSTOLIC BLOOD PRESSURE: 120 MMHG | OXYGEN SATURATION: 97 % | RESPIRATION RATE: 16 BRPM | HEART RATE: 94 BPM

## 2024-07-17 LAB
HCT VFR BLD AUTO: 23.3 % (ref 36.3–47.1)
HGB BLD-MCNC: 8 G/DL (ref 11.9–15.1)

## 2024-07-17 PROCEDURE — 99024 POSTOP FOLLOW-UP VISIT: CPT | Performed by: ADVANCED PRACTICE MIDWIFE

## 2024-07-17 PROCEDURE — 85018 HEMOGLOBIN: CPT

## 2024-07-17 PROCEDURE — 85014 HEMATOCRIT: CPT

## 2024-07-17 PROCEDURE — 6370000000 HC RX 637 (ALT 250 FOR IP): Performed by: ADVANCED PRACTICE MIDWIFE

## 2024-07-17 PROCEDURE — 36415 COLL VENOUS BLD VENIPUNCTURE: CPT

## 2024-07-17 RX ADMIN — ACETAMINOPHEN 1000 MG: 500 TABLET ORAL at 12:34

## 2024-07-17 RX ADMIN — IBUPROFEN 800 MG: 800 TABLET, FILM COATED ORAL at 01:52

## 2024-07-17 ASSESSMENT — PAIN SCALES - GENERAL
PAINLEVEL_OUTOF10: 4
PAINLEVEL_OUTOF10: 4

## 2024-07-17 ASSESSMENT — PAIN DESCRIPTION - LOCATION
LOCATION: HEAD
LOCATION: HEAD

## 2024-07-17 ASSESSMENT — PAIN DESCRIPTION - DESCRIPTORS: DESCRIPTORS: ACHING

## 2024-07-17 NOTE — DISCHARGE SUMMARY
Obstetrical Discharge Form        Gestational Age:38w5d    Antepartum complications: none    Date of Delivery: 7/15/2024    Type of Delivery:        Delivered By:  HANNAH BARGER CNMAXIMINO    Assisted By:N/A      Baby: male    Anesthesia:  epidural      Intrapartum complications: None    Feeding method: both breast and bottle -     Blood type: A POSITIVE      Rubella:    Rubella Antibody, IgG   Date Value Ref Range Status   2023 143.3 IU/mL Final     Comment:                 REFERENCE RANGE:  <5.0       NON-REACTIVE (non-immune)  5.0 TO 9.9 EQUIVOCAL  >=10.0     REACTIVE     (immune)             T. Pallidium, IGG:    T. pallidum, IgG   Date Value Ref Range Status   2023 NONREACTIVE NONREACTIVE Final     Comment:           T. pallidum antibodies are not detected.  There is no serological evidence of infection with T. pallidum (early primary syphilis   cannot be excluded).  Retest in 2-4 weeks if syphilis is clinically suspect.             Hepatitis B Surface Antigen:   Hepatitis B Surface Ag   Date Value Ref Range Status   2023 NONREACTIVE NONREACTIVE Final     HIV:    HIV Ag/Ab   Date Value Ref Range Status   2023 NONREACTIVE NONREACTIVE Final     Comment:     No laboratory evidence of HIV infection.  If acute HIV infection is suspected, consider   testing for HIV-1 RNA.         Results for orders placed or performed during the hospital encounter of 07/15/24   CBC auto differential   Result Value Ref Range    WBC 16.5 (H) 3.5 - 11.3 k/uL    RBC 4.00 3.95 - 5.11 m/uL    Hemoglobin 12.0 11.9 - 15.1 g/dL    Hematocrit 35.9 (L) 36.3 - 47.1 %    MCV 89.8 82.6 - 102.9 fL    MCH 30.0 25.2 - 33.5 pg    MCHC 33.4 28.4 - 34.8 g/dL    RDW 12.1 11.8 - 14.4 %    Platelets See Reflexed IPF Result 138 - 453 k/uL    Platelet, Fluorescence 160 138 - 453 k/uL    Platelet, Immature Fraction 14.0 (H) 1.1 - 10.3 %    NRBC Automated 0.0 0.0 per 100 WBC    Neutrophils % 89 (H) 36 - 65 %    Lymphocytes % 6 (L) 24 - 43 %     Monocytes % 4 3 - 12 %    Eosinophils % 0 (L) 1 - 4 %    Basophils % 0 0 - 2 %    Immature Granulocytes % 1 (H) 0 %    Neutrophils Absolute 14.70 (H) 1.50 - 8.10 k/uL    Lymphocytes Absolute 0.95 (L) 1.10 - 3.70 k/uL    Monocytes Absolute 0.72 0.10 - 1.20 k/uL    Eosinophils Absolute <0.03 0.00 - 0.44 k/uL    Basophils Absolute 0.03 0.00 - 0.20 k/uL    Immature Granulocytes Absolute 0.08 0.00 - 0.30 k/uL   Hemoglobin and Hematocrit   Result Value Ref Range    Hemoglobin 8.9 (L) 11.9 - 15.1 g/dL    Hematocrit 25.8 (L) 36.3 - 47.1 %   Hemoglobin and Hematocrit   Result Value Ref Range    Hemoglobin 7.7 (L) 11.9 - 15.1 g/dL    Hematocrit 23.0 (L) 36.3 - 47.1 %   TYPE AND SCREEN   Result Value Ref Range    Blood Bank Sample Expiration 07/18/2024,2359     Arm Band Number [id number]     ABO/Rh A POSITIVE     Antibody Screen NEGATIVE          Postpartum complications: immediate postpartum hemorrhage    Discharge Medication:      Medication List        CONTINUE taking these medications      Misc. Devices Kit  Nature's Blend Prenatal Multivitamin with Minerals ([facility] Baby Box)  ND: [address]-69;  Take 1 softgel by mouth daily or as instructed by provider;  #qs for 6 months     PRENATAL 1 PO            STOP taking these medications      ASPIRIN 81 PO     omeprazole 20 MG delayed release capsule  Commonly known as: PRILOSEC     progesterone 100 MG suppository  Commonly known as: ENDOMETRIN     Progesterone 200 MG Supp                  Admit date: 7/15/2024  4:31 AM    Discharge Date: 7/17/2024    Discharged to: Home in stable condition        Plan:   Follow up in 6 week(s) for post partum visit, breast feeding check, and post partum depression check

## 2024-07-17 NOTE — DISCHARGE INSTRUCTIONS
Follow-up with your OB doctor as specified.    [facility] OB Department phone: [phone]    Dr. Kirk Horne [facility]  Dr. Mahesh Tyler [facility]  [address]  [place] [address]   [place] [phone]   or [place] [phone]       DIET  Eat a well balanced diet focusing on foods high in fiber and protein.   Drink plenty of fluids especially water.  To avoid constipation you may take a mild stool softener as recommended by your doctor or midwife.    ACTIVITY  Gradually increase your activity.  Resume exercise regimen only after advice by your doctor or midwife.  Avoid lifting anything heavier than a gallon of milk for SIX weeks.   Avoid driving until your doctor or midwife has given their approval.  Rise slowly from a lying to sitting and then a standing position.  Climb stairs one at a time.  Use caution when carrying your baby up and down the stairs.  NO SEXUAL Activity for 6 weeks or until advised by your doctor; Nothing in vagina: intercourse, tampons, or douching. No swimming or tub baths x 6 weeks.  Be prepared to discuss family planning at your follow-up OB visit.   You may feel tired or have a lack of energy.  You may continue your prenatal vitamin to replenish nutrients post delivery.  Nap when baby naps to catch up on sleep.     EMOTIONS  You may feel barrientos, sad, teary, & overwhelmed.  Contact your OB provider if you feel you may be showing signs of postpartum depression, or have thoughts of harming yourself or your infant.  If infant will not stop crying, contact another adult for help or place infant in their crib on their back and take a break.  NEVER shake your infant.      BLEEDING  Vaginal bleeding will decrease in amount over the next few weeks.  You will notice that as your activity increases, your flow may increase.  This is your body's way of telling you, you need to take things easier and rest more often.  Call your care provider if you are saturating more

## 2024-07-17 NOTE — PROGRESS NOTES
Department of Obstetrics and Gynecology  Labor and Delivery       Post Partum Progress Note             SUBJECTIVE:  Post partum day 2. Patient states she is feeling well, a little fatigued, but states overall doing well. Pt states she did shower yesterday and was up walking around and felt slightly dizzy.     OBJECTIVE:      Vitals:  BP (!) 130/51   Pulse (!) 103   Temp 97.8 °F (36.6 °C) (Oral)   Resp 16   LMP 10/12/2023   SpO2 98%   Breastfeeding Unknown   Patient Vitals for the past 24 hrs:   BP Temp Temp src Pulse Resp SpO2   24 0212 (!) 130/51 97.8 °F (36.6 °C) Oral (!) 103 16 --   24 131/66 -- -- (!) 107 16 98 %   24 1748 127/70 97.7 °F (36.5 °C) Oral 91 16 --   24 1625 123/74 97.8 °F (36.6 °C) Oral 96 16 --         ABDOMEN: normal shape, position and consistency  Uterus:  Size normal  Breast:normal appearance, no masses or tenderness  Cor: RRR no Murmurs  Pulmonary: clear to auscultation anterior and posterior  Extremities: no Clubbing cyanosis or ecchymosis    DATA:    Hemoglobin/Hematocrit:    Lab Results   Component Value Date/Time    HGB 7.7 2024 06:00 AM    HCT 23.0 2024 06:00 AM         ASSESSMENT :      Principal Problem:    Third trimester pregnancy  Active Problems:     (normal spontaneous vaginal delivery)    Third-stage postpartum hemorrhage          Plan:  Anticipate discharge home today. Repeat H&H today. Discussed blood transfusion and patient declines at this time.

## 2024-07-17 NOTE — PLAN OF CARE
Problem: Pain  Goal: Verbalizes/displays adequate comfort level or baseline comfort level  2024 by Rivka Ansari RN  Outcome: Progressing  2024 by Joya Lee RN  Outcome: Progressing     Problem: Vaginal Birth or  Section  Goal: Fetal and maternal status remain reassuring during the birth process  Description:  Birth OB-Pregnancy care plan goal which identifies if the fetal and maternal status remain reassuring during the birth process  2024 by Joya Lee RN  Outcome: Completed     Problem: Postpartum  Goal: Experiences normal postpartum course  Description:  Postpartum OB-Pregnancy care plan goal which identifies if the mother is experiencing a normal postpartum course  2024 by Rivka Ansari RN  Outcome: Progressing  2024 by Joya Lee RN  Outcome: Progressing  Goal: Appropriate maternal -  bonding  Description:  Postpartum OB-Pregnancy care plan goal which identifies if the mother and  are bonding appropriately  2024 by Rivka Ansari RN  Outcome: Progressing  2024 by Joya Lee RN  Outcome: Progressing  Goal: Establishment of infant feeding pattern  Description:  Postpartum OB-Pregnancy care plan goal which identifies if the mother is establishing a feeding pattern with their   2024 by Rivka Ansari RN  Outcome: Progressing  2024 by Joya Lee RN  Outcome: Progressing  Goal: Incisions, wounds, or drain sites healing without S/S of infection  2024 by Rivka Ansari RN  Outcome: Progressing  2024 by Joya Lee RN  Outcome: Progressing     Problem: Infection - Adult  Goal: Absence of infection at discharge  2024 by Rivka Ansari RN  Outcome: Progressing  2024 by Joya Lee RN  Outcome: Progressing  Goal: Absence of infection during hospitalization  2024 by Elan

## 2024-07-17 NOTE — LACTATION NOTE
Gave info on Spectra pump usage per pt request.  Advised to follow up with LC if needed. Verb understanding.

## 2024-07-17 NOTE — FLOWSHEET NOTE
Patient off unit in stable condition.    Departure Mode: with significant other.    Mobility at Departure: wheelchair    Discharged to: private residence    Time of Discharge: 1413

## 2024-07-17 NOTE — PLAN OF CARE
Problem: Pain  Goal: Verbalizes/displays adequate comfort level or baseline comfort level  2024 1223 by Rivka Ansari RN  Outcome: Progressing  2024 0857 by Rivka Ansari RN  Outcome: Progressing     Problem: Postpartum  Goal: Experiences normal postpartum course  Description:  Postpartum OB-Pregnancy care plan goal which identifies if the mother is experiencing a normal postpartum course  2024 1223 by Rivka Ansari RN  Outcome: Progressing  2024 0857 by Rivka Ansari RN  Outcome: Progressing  Goal: Appropriate maternal -  bonding  Description:  Postpartum OB-Pregnancy care plan goal which identifies if the mother and  are bonding appropriately  2024 1223 by Rivka Ansari RN  Outcome: Progressing  2024 08 by Rivka Ansari RN  Outcome: Progressing  Goal: Establishment of infant feeding pattern  Description:  Postpartum OB-Pregnancy care plan goal which identifies if the mother is establishing a feeding pattern with their   2024 1223 by Rivka Ansari RN  Outcome: Progressing  2024 0857 by Rivka Ansari RN  Outcome: Progressing  Goal: Incisions, wounds, or drain sites healing without S/S of infection  2024 1223 by Rivka Ansari RN  Outcome: Progressing  2024 08 by Rivka Ansari RN  Outcome: Progressing     Problem: Infection - Adult  Goal: Absence of infection at discharge  2024 1223 by Rivka Ansari RN  Outcome: Progressing  2024 0857 by Rivka Ansari RN  Outcome: Progressing  Goal: Absence of infection during hospitalization  2024 1223 by Rivka Ansari RN  Outcome: Progressing  2024 0857 by Rivka Ansari RN  Outcome: Progressing  Goal: Absence of fever/infection during anticipated neutropenic period  2024 1223 by Rivka Ansari RN  Outcome: Progressing  2024 08 by Rivka Ansari RN  Outcome: Progressing     Problem:  Safety - Adult  Goal: Free from fall injury  7/17/2024 1223 by Rivka Ansari, RN  Outcome: Progressing  7/17/2024 0857 by Rivka Ansari, RN  Outcome: Progressing     Problem: Discharge Planning  Goal: Discharge to home or other facility with appropriate resources  7/17/2024 1223 by Rivka Ansari, RN  Outcome: Progressing  7/17/2024 0857 by Rivka Ansari, RN  Outcome: Progressing

## 2024-07-18 ENCOUNTER — CARE COORDINATION (OUTPATIENT)
Dept: OTHER | Facility: CLINIC | Age: 29
End: 2024-07-18

## 2024-07-18 NOTE — H&P
Transportation Needs: Not on file   Physical Activity: Not on file   Stress: Not on file   Social Connections: Not on file   Intimate Partner Violence: Not on file   Housing Stability: Not on file     Family History:       Problem Relation Age of Onset    No Known Problems Paternal Grandfather     No Known Problems Paternal Grandmother     Cancer Maternal Grandmother         Gallbladder    No Known Problems Maternal Grandfather     No Known Problems Father     No Known Problems Mother     No Known Problems Brother     No Known Problems Brother     Breast Cancer Neg Hx     Ovarian Cancer Neg Hx      Medications Prior to Admission:  No medications prior to admission.    REVIEW OF SYSTEMS:    CONSTITUTIONAL:  negative  RESPIRATORY:  negative  CARDIOVASCULAR:  negative  GASTROINTESTINAL:  negative  ALLERGIC/IMMUNOLOGIC:  negative  NEUROLOGICAL:  negative  BEHAVIOR/PSYCH:  negative    PHYSICAL EXAM:  Vitals:    07/16/24 1748 07/16/24 2025 07/17/24 0212 07/17/24 0829   BP: 127/70 131/66 (!) 130/51 120/60   Pulse: 91 (!) 107 (!) 103 94   Resp: 16 16 16 16   Temp: 97.7 °F (36.5 °C)  97.8 °F (36.6 °C) 98 °F (36.7 °C)   TempSrc: Oral  Oral Oral   SpO2:  98%  97%     General appearance:  awake, alert, cooperative, no apparent distress, and appears stated age  Neurologic:  Awake, alert, oriented to name, place and time.    Lungs:  No increased work of breathing, good air exchange  Abdomen:  Soft, non tender, gravid, consistent with her gestational age, EFW by Leopald's manouever was 7-8   Fetal heart rate:  Reassuring.  Pelvis:  Adequate pelvis  Cervix: 5-6   -1        Contraction frequency:  2-4 minutes    Membranes:  Intact    Labs:  Blood Type: A POSITIVE    Rubella:    Rubella Antibody, IgG   Date Value Ref Range Status   12/07/2023 143.3 IU/mL Final     Comment:                 REFERENCE RANGE:  <5.0       NON-REACTIVE (non-immune)  5.0 TO 9.9 EQUIVOCAL  >=10.0     REACTIVE     (immune)             T. Pallidium, IGG:     T. pallidum, IgG   Date Value Ref Range Status   12/07/2023 NONREACTIVE NONREACTIVE Final     Comment:           T. pallidum antibodies are not detected.  There is no serological evidence of infection with T. pallidum (early primary syphilis   cannot be excluded).  Retest in 2-4 weeks if syphilis is clinically suspect.             Sickle Cell Screen: No results found for: \"SICKLE\"  Hepatitis B Surface Antigen:   Hepatitis B Surface Ag   Date Value Ref Range Status   12/07/2023 NONREACTIVE NONREACTIVE Final     HIV:    HIV Ag/Ab   Date Value Ref Range Status   12/07/2023 NONREACTIVE NONREACTIVE Final     Comment:     No laboratory evidence of HIV infection.  If acute HIV infection is suspected, consider   testing for HIV-1 RNA.                Results for orders placed or performed during the hospital encounter of 07/15/24   CBC auto differential   Result Value Ref Range    WBC 16.5 (H) 3.5 - 11.3 k/uL    RBC 4.00 3.95 - 5.11 m/uL    Hemoglobin 12.0 11.9 - 15.1 g/dL    Hematocrit 35.9 (L) 36.3 - 47.1 %    MCV 89.8 82.6 - 102.9 fL    MCH 30.0 25.2 - 33.5 pg    MCHC 33.4 28.4 - 34.8 g/dL    RDW 12.1 11.8 - 14.4 %    Platelets See Reflexed IPF Result 138 - 453 k/uL    Platelet, Fluorescence 160 138 - 453 k/uL    Platelet, Immature Fraction 14.0 (H) 1.1 - 10.3 %    NRBC Automated 0.0 0.0 per 100 WBC    Neutrophils % 89 (H) 36 - 65 %    Lymphocytes % 6 (L) 24 - 43 %    Monocytes % 4 3 - 12 %    Eosinophils % 0 (L) 1 - 4 %    Basophils % 0 0 - 2 %    Immature Granulocytes % 1 (H) 0 %    Neutrophils Absolute 14.70 (H) 1.50 - 8.10 k/uL    Lymphocytes Absolute 0.95 (L) 1.10 - 3.70 k/uL    Monocytes Absolute 0.72 0.10 - 1.20 k/uL    Eosinophils Absolute <0.03 0.00 - 0.44 k/uL    Basophils Absolute 0.03 0.00 - 0.20 k/uL    Immature Granulocytes Absolute 0.08 0.00 - 0.30 k/uL   Hemoglobin and Hematocrit   Result Value Ref Range    Hemoglobin 8.9 (L) 11.9 - 15.1 g/dL    Hematocrit 25.8 (L) 36.3 - 47.1 %   Hemoglobin and

## 2024-07-19 ENCOUNTER — CARE COORDINATION (OUTPATIENT)
Dept: OTHER | Facility: CLINIC | Age: 29
End: 2024-07-19

## 2024-08-02 ENCOUNTER — CARE COORDINATION (OUTPATIENT)
Dept: OTHER | Facility: CLINIC | Age: 29
End: 2024-08-02

## 2024-08-07 ENCOUNTER — TELEPHONE (OUTPATIENT)
Dept: OBGYN | Age: 29
End: 2024-08-07

## 2024-08-07 NOTE — TELEPHONE ENCOUNTER
Post Partum Phone Call Vaginal delivery  Follow Up 2 weeks perico after discharge      Date of service: 2024    Hugo Monroy  Is a 28 y.o.      Delivery date 7/15/25    Type of delivery:       Laceration:Yes, Second degree laceration.  &  sulcus laceration     Infant gender: male    Infant name: Rg      Are you breast or bottle feeding? Pumping for bottles     How did first pediatrician visit go: good, had to recheck bilirubin     How are you feeling: good now just tired     How is your bleeding: fine now     Any questions or concerns: No     Are you on any medications for depression. no    Information given to pt. N/a pt doing well and feeling well     EPPDS:       2024    10:50 AM   Postpartum Depression Scale   I have been able to laugh and see the funny side of things As much as I always could   I have looked forward with enjoyment to things As much as I ever did   I have blamed myself unnecessarily when things went wrong No, never   I have been anxious or worried for no good reason No, not at all   I have felt scared or panicky for no good reason No, not at all   I haven't been able to cope lately No, I have been coping as well as ever   I have been so unhappy that I have had difficulty sleeping Not at all   I have felt sad or miserable No, not at all   I have been so unhappy that I have been crying No, never   The thought of harming myself has occurred to me Never   Total 0       If above 10 schedule pt and appointment. Within a few days    Lets schedule your appt now -   Date - 24  What for - 6 wk ppv     History of thyroid issues?No  If yes order TSH with reflux to have done in lab within a week

## 2024-08-20 ENCOUNTER — POSTPARTUM VISIT (OUTPATIENT)
Dept: OBGYN | Age: 29
End: 2024-08-20
Payer: COMMERCIAL

## 2024-08-20 VITALS
SYSTOLIC BLOOD PRESSURE: 120 MMHG | WEIGHT: 202 LBS | HEIGHT: 64 IN | DIASTOLIC BLOOD PRESSURE: 70 MMHG | BODY MASS INDEX: 34.49 KG/M2

## 2024-08-20 DIAGNOSIS — N89.8 VAGINAL GRANULATION TISSUE: ICD-10-CM

## 2024-08-20 PROCEDURE — 0503F POSTPARTUM CARE VISIT: CPT | Performed by: ADVANCED PRACTICE MIDWIFE

## 2024-08-20 PROCEDURE — 17250 CHEM CAUT OF GRANLTJ TISSUE: CPT | Performed by: ADVANCED PRACTICE MIDWIFE

## 2024-08-20 NOTE — PROGRESS NOTES
POSTPARTUM EXAM    Date of service: 2024    Hugo Monroy  Is a 28 y.o.  female    PT's PCP is: None, None     : 1995     OB History    Para Term  AB Living   4 1 1   3 1   SAB IAB Ectopic Molar Multiple Live Births   3       0 1      # Outcome Date GA Lbr Faisal/2nd Weight Sex Type Anes PTL Lv   4 Term 07/15/24 38w5d 06:05 / 01:14 3.487 kg (7 lb 11 oz) M Vag-Spont EPI N LES      Complications: Other Excessive Bleeding   3 2023 4w0d    SAB      2 SAB 2022 4w0d    SAB      1 2022 6w0d    SAB           Social History     Tobacco Use   Smoking Status Never   Smokeless Tobacco Never         Social History     Substance and Sexual Activity   Alcohol Use Not Currently         Delivery date 07/15/24    Type of delivery:  Spontaneous vaginal delivery    Laceration:Yes, Second degree laceration. Bilateral sulcus     Infant gender: male    Infant name: Rg     Are you breast or bottle feeding?  Bottle    Have you been sexually active since delivery? No    Vital Signs: Blood pressure 120/70, height 1.626 m (5' 4\"), weight 91.6 kg (202 lb), last menstrual period 10/12/2023, currently breastfeeding.     Labs:    Blood Type and Rh: A POSITIVE          Allergies: Ceclor [cefaclor] and Pcn [penicillins]      Current Outpatient Medications:     Prenatal MV-Min-Fe Fum-FA-DHA (PRENATAL 1 PO), Take by mouth, Disp: , Rfl:     Misc. Devices KIT, Nature's Blend Prenatal Multivitamin with Minerals ([facility] Baby Box)  NDC: [address]-54;  Take 1 softgel by mouth daily or as instructed by provider;  #qs for 6 months (Patient not taking: Reported on 3/5/2024), Disp: 1 kit, Rfl: 1    Last Yearly date:      Last pap date and results: 24    Last HPV date and results: never     Chief Complaint   Patient presents with    Postpartum Care     Care following vaginal delivery on 07/15/24, pt unsure if she has a new tear or if its opened, bleeding had stopped. Pt is currently breastfeeding,

## 2025-01-23 ENCOUNTER — OFFICE VISIT (OUTPATIENT)
Dept: OBGYN | Age: 30
End: 2025-01-23
Payer: COMMERCIAL

## 2025-01-23 VITALS
BODY MASS INDEX: 35.1 KG/M2 | WEIGHT: 205.6 LBS | DIASTOLIC BLOOD PRESSURE: 76 MMHG | SYSTOLIC BLOOD PRESSURE: 120 MMHG | HEIGHT: 64 IN

## 2025-01-23 DIAGNOSIS — Z01.419 WELL WOMAN EXAM WITH ROUTINE GYNECOLOGICAL EXAM: Primary | ICD-10-CM

## 2025-01-23 PROCEDURE — 99395 PREV VISIT EST AGE 18-39: CPT | Performed by: ADVANCED PRACTICE MIDWIFE

## 2025-01-23 SDOH — ECONOMIC STABILITY: FOOD INSECURITY: WITHIN THE PAST 12 MONTHS, THE FOOD YOU BOUGHT JUST DIDN'T LAST AND YOU DIDN'T HAVE MONEY TO GET MORE.: NEVER TRUE

## 2025-01-23 SDOH — ECONOMIC STABILITY: FOOD INSECURITY: WITHIN THE PAST 12 MONTHS, YOU WORRIED THAT YOUR FOOD WOULD RUN OUT BEFORE YOU GOT MONEY TO BUY MORE.: NEVER TRUE

## 2025-01-23 ASSESSMENT — PATIENT HEALTH QUESTIONNAIRE - PHQ9
2. FEELING DOWN, DEPRESSED OR HOPELESS: NOT AT ALL
1. LITTLE INTEREST OR PLEASURE IN DOING THINGS: NOT AT ALL
SUM OF ALL RESPONSES TO PHQ QUESTIONS 1-9: 0
SUM OF ALL RESPONSES TO PHQ9 QUESTIONS 1 & 2: 0

## 2025-01-23 NOTE — PROGRESS NOTES
YEARLY PHYSICAL    Date of service: 2025    Hugo Monroy  Is a 29 y.o.  , female    PT's PCP is: None, None     : 1995                                             Subjective:       No LMP recorded (lmp unknown). (Menstrual status: Postpartum).     Are your menses regular: no    OB History    Para Term  AB Living   4 1 1   3 1   SAB IAB Ectopic Molar Multiple Live Births   3       0 1      # Outcome Date GA Lbr Faisal/2nd Weight Sex Type Anes PTL Lv   4 Term 07/15/24 38w5d 06:05 / 01:14 3.487 kg (7 lb 11 oz) M Vag-Spont EPI N LES      Complications: Other Excessive Bleeding   3 2023 4w0d    SAB      2 2022 4w0d    SAB      1 2022 6w0d    SAB           Social History     Tobacco Use   Smoking Status Never   Smokeless Tobacco Never        Social History     Substance and Sexual Activity   Alcohol Use Not Currently       Family History   Problem Relation Age of Onset    No Known Problems Mother     No Known Problems Father     No Known Problems Brother     No Known Problems Brother     Cancer Maternal Grandmother         Gallbladder    No Known Problems Maternal Grandfather     No Known Problems Paternal Grandmother     No Known Problems Paternal Grandfather     Breast Cancer Neg Hx     Ovarian Cancer Neg Hx        Any family history of breast or ovarian cancer: No    Any family history of blood clots: No    Allergies: Ceclor [cefaclor] and Pcn [penicillins]    No current outpatient medications on file.    Social History     Substance and Sexual Activity   Sexual Activity Not Currently    Partners: Male       Any bleeding or pain with intercourse: NA    Last Yearly date:  2024    Last pap date : Date of last Cervical Cancer screen (HPV or PAP): 2024     results: negative    Last HPV date and results: never    Has pt ever had an abnormal:No    IF yes result and date: never     Mammogram Result

## 2025-01-23 NOTE — PATIENT INSTRUCTIONS
SURVEY:    You may be receiving a survey regarding your visit today.    Please complete the survey to enable us to provide the highest quality of care to you and your family.    We strive for all 5's - thank you    If you cannot score us a very good (5) on any question, please call the office to discuss this with Hoda (our ). We would like to discuss how we could of made your experience a very good one.    Hoda: [phone]    Thank you.